# Patient Record
Sex: FEMALE | Race: WHITE | NOT HISPANIC OR LATINO | URBAN - METROPOLITAN AREA
[De-identification: names, ages, dates, MRNs, and addresses within clinical notes are randomized per-mention and may not be internally consistent; named-entity substitution may affect disease eponyms.]

---

## 2022-09-28 ENCOUNTER — INPATIENT (INPATIENT)
Facility: HOSPITAL | Age: 87
LOS: 1 days | Discharge: HOME HEALTH | End: 2022-09-30
Attending: INTERNAL MEDICINE | Admitting: INTERNAL MEDICINE

## 2022-09-28 VITALS
TEMPERATURE: 98 F | OXYGEN SATURATION: 98 % | HEART RATE: 78 BPM | RESPIRATION RATE: 20 BRPM | SYSTOLIC BLOOD PRESSURE: 165 MMHG | DIASTOLIC BLOOD PRESSURE: 87 MMHG

## 2022-09-28 DIAGNOSIS — Z96.651 PRESENCE OF RIGHT ARTIFICIAL KNEE JOINT: Chronic | ICD-10-CM

## 2022-09-28 LAB
ALBUMIN SERPL ELPH-MCNC: 4.2 G/DL — SIGNIFICANT CHANGE UP (ref 3.5–5.2)
ALP SERPL-CCNC: 56 U/L — SIGNIFICANT CHANGE UP (ref 30–115)
ALT FLD-CCNC: 14 U/L — SIGNIFICANT CHANGE UP (ref 0–41)
ANION GAP SERPL CALC-SCNC: 12 MMOL/L — SIGNIFICANT CHANGE UP (ref 7–14)
APTT BLD: 173.3 SEC — CRITICAL HIGH (ref 27–39.2)
APTT BLD: 32.8 SEC — SIGNIFICANT CHANGE UP (ref 27–39.2)
APTT BLD: 86.3 SEC — CRITICAL HIGH (ref 27–39.2)
AST SERPL-CCNC: 22 U/L — SIGNIFICANT CHANGE UP (ref 0–41)
BASOPHILS # BLD AUTO: 0 K/UL — SIGNIFICANT CHANGE UP (ref 0–0.2)
BASOPHILS NFR BLD AUTO: 0 % — SIGNIFICANT CHANGE UP (ref 0–1)
BILIRUB SERPL-MCNC: 0.4 MG/DL — SIGNIFICANT CHANGE UP (ref 0.2–1.2)
BUN SERPL-MCNC: 38 MG/DL — HIGH (ref 10–20)
CALCIUM SERPL-MCNC: 8.9 MG/DL — SIGNIFICANT CHANGE UP (ref 8.4–10.5)
CHLORIDE SERPL-SCNC: 104 MMOL/L — SIGNIFICANT CHANGE UP (ref 98–110)
CK SERPL-CCNC: 72 U/L — SIGNIFICANT CHANGE UP (ref 0–225)
CO2 SERPL-SCNC: 25 MMOL/L — SIGNIFICANT CHANGE UP (ref 17–32)
CREAT SERPL-MCNC: 1.8 MG/DL — HIGH (ref 0.7–1.5)
EGFR: 27 ML/MIN/1.73M2 — LOW
EOSINOPHIL # BLD AUTO: 0.06 K/UL — SIGNIFICANT CHANGE UP (ref 0–0.7)
EOSINOPHIL NFR BLD AUTO: 1.4 % — SIGNIFICANT CHANGE UP (ref 0–8)
GLUCOSE SERPL-MCNC: 97 MG/DL — SIGNIFICANT CHANGE UP (ref 70–99)
HCT VFR BLD CALC: 32.1 % — LOW (ref 37–47)
HCT VFR BLD CALC: 34.9 % — LOW (ref 37–47)
HGB BLD-MCNC: 10.9 G/DL — LOW (ref 12–16)
HGB BLD-MCNC: 12.1 G/DL — SIGNIFICANT CHANGE UP (ref 12–16)
IMM GRANULOCYTES NFR BLD AUTO: 0 % — LOW (ref 0.1–0.3)
INR BLD: 1.11 RATIO — SIGNIFICANT CHANGE UP (ref 0.65–1.3)
LYMPHOCYTES # BLD AUTO: 0.97 K/UL — LOW (ref 1.2–3.4)
LYMPHOCYTES # BLD AUTO: 22.4 % — SIGNIFICANT CHANGE UP (ref 20.5–51.1)
MCHC RBC-ENTMCNC: 29.7 PG — SIGNIFICANT CHANGE UP (ref 27–31)
MCHC RBC-ENTMCNC: 30.2 PG — SIGNIFICANT CHANGE UP (ref 27–31)
MCHC RBC-ENTMCNC: 34 G/DL — SIGNIFICANT CHANGE UP (ref 32–37)
MCHC RBC-ENTMCNC: 34.7 G/DL — SIGNIFICANT CHANGE UP (ref 32–37)
MCV RBC AUTO: 87 FL — SIGNIFICANT CHANGE UP (ref 81–99)
MCV RBC AUTO: 87.5 FL — SIGNIFICANT CHANGE UP (ref 81–99)
MONOCYTES # BLD AUTO: 0.48 K/UL — SIGNIFICANT CHANGE UP (ref 0.1–0.6)
MONOCYTES NFR BLD AUTO: 11.1 % — HIGH (ref 1.7–9.3)
NEUTROPHILS # BLD AUTO: 2.82 K/UL — SIGNIFICANT CHANGE UP (ref 1.4–6.5)
NEUTROPHILS NFR BLD AUTO: 65.1 % — SIGNIFICANT CHANGE UP (ref 42.2–75.2)
NRBC # BLD: 0 /100 WBCS — SIGNIFICANT CHANGE UP (ref 0–0)
NRBC # BLD: 0 /100 WBCS — SIGNIFICANT CHANGE UP (ref 0–0)
PLATELET # BLD AUTO: 180 K/UL — SIGNIFICANT CHANGE UP (ref 130–400)
PLATELET # BLD AUTO: 182 K/UL — SIGNIFICANT CHANGE UP (ref 130–400)
POTASSIUM SERPL-MCNC: 3.9 MMOL/L — SIGNIFICANT CHANGE UP (ref 3.5–5)
POTASSIUM SERPL-SCNC: 3.9 MMOL/L — SIGNIFICANT CHANGE UP (ref 3.5–5)
PROT SERPL-MCNC: 6.4 G/DL — SIGNIFICANT CHANGE UP (ref 6–8)
PROTHROM AB SERPL-ACNC: 12.7 SEC — SIGNIFICANT CHANGE UP (ref 9.95–12.87)
RBC # BLD: 3.67 M/UL — LOW (ref 4.2–5.4)
RBC # BLD: 4.01 M/UL — LOW (ref 4.2–5.4)
RBC # FLD: 13.4 % — SIGNIFICANT CHANGE UP (ref 11.5–14.5)
RBC # FLD: 13.6 % — SIGNIFICANT CHANGE UP (ref 11.5–14.5)
SARS-COV-2 RNA SPEC QL NAA+PROBE: SIGNIFICANT CHANGE UP
SODIUM SERPL-SCNC: 141 MMOL/L — SIGNIFICANT CHANGE UP (ref 135–146)
TROPONIN T SERPL-MCNC: <0.01 NG/ML — SIGNIFICANT CHANGE UP
WBC # BLD: 4.33 K/UL — LOW (ref 4.8–10.8)
WBC # BLD: 5.36 K/UL — SIGNIFICANT CHANGE UP (ref 4.8–10.8)
WBC # FLD AUTO: 4.33 K/UL — LOW (ref 4.8–10.8)
WBC # FLD AUTO: 5.36 K/UL — SIGNIFICANT CHANGE UP (ref 4.8–10.8)

## 2022-09-28 PROCEDURE — 99285 EMERGENCY DEPT VISIT HI MDM: CPT | Mod: FS,CS

## 2022-09-28 PROCEDURE — 71045 X-RAY EXAM CHEST 1 VIEW: CPT | Mod: 26

## 2022-09-28 PROCEDURE — 93010 ELECTROCARDIOGRAM REPORT: CPT

## 2022-09-28 RX ORDER — AMLODIPINE BESYLATE 2.5 MG/1
5 TABLET ORAL DAILY
Refills: 0 | Status: DISCONTINUED | OUTPATIENT
Start: 2022-09-28 | End: 2022-09-30

## 2022-09-28 RX ORDER — ISOSORBIDE MONONITRATE 60 MG/1
30 TABLET, EXTENDED RELEASE ORAL DAILY
Refills: 0 | Status: DISCONTINUED | OUTPATIENT
Start: 2022-09-28 | End: 2022-09-30

## 2022-09-28 RX ORDER — ISOSORBIDE MONONITRATE 60 MG/1
1 TABLET, EXTENDED RELEASE ORAL
Qty: 0 | Refills: 0 | DISCHARGE

## 2022-09-28 RX ORDER — LANOLIN ALCOHOL/MO/W.PET/CERES
3 CREAM (GRAM) TOPICAL AT BEDTIME
Refills: 0 | Status: DISCONTINUED | OUTPATIENT
Start: 2022-09-28 | End: 2022-09-30

## 2022-09-28 RX ORDER — CALCIUM CARBONATE 500(1250)
2 TABLET ORAL
Qty: 0 | Refills: 0 | DISCHARGE

## 2022-09-28 RX ORDER — ZOLPIDEM TARTRATE 10 MG/1
1 TABLET ORAL
Qty: 0 | Refills: 0 | DISCHARGE

## 2022-09-28 RX ORDER — ERGOCALCIFEROL 1.25 MG/1
50000 CAPSULE ORAL
Refills: 0 | Status: DISCONTINUED | OUTPATIENT
Start: 2022-09-28 | End: 2022-09-30

## 2022-09-28 RX ORDER — METOPROLOL TARTRATE 50 MG
12.5 TABLET ORAL EVERY 12 HOURS
Refills: 0 | Status: DISCONTINUED | OUTPATIENT
Start: 2022-09-28 | End: 2022-09-30

## 2022-09-28 RX ORDER — NITROGLYCERIN 6.5 MG
0.4 CAPSULE, EXTENDED RELEASE ORAL
Refills: 0 | Status: DISCONTINUED | OUTPATIENT
Start: 2022-09-28 | End: 2022-09-30

## 2022-09-28 RX ORDER — HEPARIN SODIUM 5000 [USP'U]/ML
2000 INJECTION INTRAVENOUS; SUBCUTANEOUS EVERY 6 HOURS
Refills: 0 | Status: DISCONTINUED | OUTPATIENT
Start: 2022-09-28 | End: 2022-09-30

## 2022-09-28 RX ORDER — CALCIUM CARBONATE 500(1250)
2 TABLET ORAL DAILY
Refills: 0 | Status: DISCONTINUED | OUTPATIENT
Start: 2022-09-28 | End: 2022-09-30

## 2022-09-28 RX ORDER — HEPARIN SODIUM 5000 [USP'U]/ML
INJECTION INTRAVENOUS; SUBCUTANEOUS
Qty: 25000 | Refills: 0 | Status: DISCONTINUED | OUTPATIENT
Start: 2022-09-28 | End: 2022-09-30

## 2022-09-28 RX ORDER — FLUTICASONE PROPIONATE 220 MCG
1 AEROSOL WITH ADAPTER (GRAM) INHALATION
Qty: 0 | Refills: 0 | DISCHARGE

## 2022-09-28 RX ORDER — HEPARIN SODIUM 5000 [USP'U]/ML
4000 INJECTION INTRAVENOUS; SUBCUTANEOUS EVERY 6 HOURS
Refills: 0 | Status: DISCONTINUED | OUTPATIENT
Start: 2022-09-28 | End: 2022-09-30

## 2022-09-28 RX ORDER — PANTOPRAZOLE SODIUM 20 MG/1
40 TABLET, DELAYED RELEASE ORAL
Refills: 0 | Status: DISCONTINUED | OUTPATIENT
Start: 2022-09-28 | End: 2022-09-30

## 2022-09-28 RX ORDER — APIXABAN 2.5 MG/1
1 TABLET, FILM COATED ORAL
Qty: 0 | Refills: 0 | DISCHARGE

## 2022-09-28 RX ORDER — ERGOCALCIFEROL 1.25 MG/1
1 CAPSULE ORAL
Qty: 0 | Refills: 0 | DISCHARGE

## 2022-09-28 RX ORDER — HEPARIN SODIUM 5000 [USP'U]/ML
4000 INJECTION INTRAVENOUS; SUBCUTANEOUS ONCE
Refills: 0 | Status: COMPLETED | OUTPATIENT
Start: 2022-09-28 | End: 2022-09-28

## 2022-09-28 RX ORDER — LEVOTHYROXINE SODIUM 125 MCG
1 TABLET ORAL
Qty: 0 | Refills: 0 | DISCHARGE

## 2022-09-28 RX ORDER — LEVOTHYROXINE SODIUM 125 MCG
50 TABLET ORAL DAILY
Refills: 0 | Status: DISCONTINUED | OUTPATIENT
Start: 2022-09-28 | End: 2022-09-30

## 2022-09-28 RX ORDER — ASPIRIN/CALCIUM CARB/MAGNESIUM 324 MG
1 TABLET ORAL
Qty: 0 | Refills: 0 | DISCHARGE

## 2022-09-28 RX ORDER — LANOLIN ALCOHOL/MO/W.PET/CERES
1 CREAM (GRAM) TOPICAL
Qty: 0 | Refills: 0 | DISCHARGE

## 2022-09-28 RX ORDER — CHLORHEXIDINE GLUCONATE 213 G/1000ML
1 SOLUTION TOPICAL
Refills: 0 | Status: DISCONTINUED | OUTPATIENT
Start: 2022-09-28 | End: 2022-09-30

## 2022-09-28 RX ORDER — CHOLECALCIFEROL (VITAMIN D3) 125 MCG
1 CAPSULE ORAL
Qty: 0 | Refills: 0 | DISCHARGE

## 2022-09-28 RX ORDER — PANTOPRAZOLE SODIUM 20 MG/1
1 TABLET, DELAYED RELEASE ORAL
Qty: 0 | Refills: 0 | DISCHARGE

## 2022-09-28 RX ORDER — METOPROLOL TARTRATE 50 MG
0.5 TABLET ORAL
Qty: 0 | Refills: 0 | DISCHARGE

## 2022-09-28 RX ORDER — ASPIRIN/CALCIUM CARB/MAGNESIUM 324 MG
81 TABLET ORAL DAILY
Refills: 0 | Status: DISCONTINUED | OUTPATIENT
Start: 2022-09-28 | End: 2022-09-30

## 2022-09-28 RX ADMIN — HEPARIN SODIUM 4000 UNIT(S): 5000 INJECTION INTRAVENOUS; SUBCUTANEOUS at 14:31

## 2022-09-28 RX ADMIN — ISOSORBIDE MONONITRATE 30 MILLIGRAM(S): 60 TABLET, EXTENDED RELEASE ORAL at 11:24

## 2022-09-28 RX ADMIN — ERGOCALCIFEROL 50000 UNIT(S): 1.25 CAPSULE ORAL at 11:24

## 2022-09-28 RX ADMIN — HEPARIN SODIUM 1000 UNIT(S)/HR: 5000 INJECTION INTRAVENOUS; SUBCUTANEOUS at 20:40

## 2022-09-28 RX ADMIN — Medication 81 MILLIGRAM(S): at 11:24

## 2022-09-28 RX ADMIN — Medication 12.5 MILLIGRAM(S): at 17:03

## 2022-09-28 RX ADMIN — HEPARIN SODIUM 1000 UNIT(S)/HR: 5000 INJECTION INTRAVENOUS; SUBCUTANEOUS at 14:30

## 2022-09-28 NOTE — H&P ADULT - HISTORY OF PRESENT ILLNESS
The patient is an 87 year old female with past medical history CKD, DLD, HTN, hypothyroidism and new onset a-fib (on eliquis) who presented to the hospital due to palpitations and mild chest pressure.  The patient was recently admitted to [facility] in [place] two weeks ago due to palpitations where she was found to be in new onset a-fib which resolved on its own and she was discharged on eliquis.  She was then readmitted on 9/26 to [facility] due to similar palpitations and mild chest pressure radiating to the back and neck.  She went for CTA head and neck which was unremarkable, there was tortuosity of the proximal carotid arteries in the lower neck.  As per the patient's daughter in law, the troponins were elevated at that time and the patient was taken for a nuclear stress test on 9/26 which showed a left ventricular ejection fraction of >70% with a small sized defect of mild severity in the apical wall that is reversible suggestive of ischemia.  The patient was offered cardiac cath but the patient's family wanted a second opinion and to establish care with a cardiologist prior to cardiac cath.  The patient was discharged on 9/27 after troponins were downtrending.      Early this morning the patient felt palpitations and mild chest pressure and called an ambulance to take her to the hospital.  She states that the palpitations and chest pressure started at around 2 AM this morning and resolved by 4 AM.  The chest pressure was mild but constant, did not radiate and was a 3/10 on the pain severity scale.  Currently the patient denies chest pain or palpitations and feels well.      In the ED:   Troponins were negative this morning, EKG demonstrated sinus rhythm.  Chest x-ray was performed and on my read is within normal limits.   The patient is an 87 year old female with past medical history CKD, DLD, osteoporosis, HTN, hypothyroidism and new onset a-fib (on eliquis) who presented to the hospital due to palpitations and mild chest pressure.  The patient was recently admitted to [facility] in [place] two weeks ago due to palpitations where she was found to be in new onset a-fib which resolved on its own and she was discharged on eliquis.  She was then readmitted on 9/26 to [facility] due to similar palpitations and mild chest pressure radiating to the back and neck.  She went for CTA head and neck which was unremarkable, there was tortuosity of the proximal carotid arteries in the lower neck.  As per the patient's daughter in law, the troponins were elevated at that time and the patient was taken for a nuclear stress test on 9/26 which showed a left ventricular ejection fraction of >70% with a small sized defect of mild severity in the apical wall that is reversible suggestive of ischemia.  The patient was offered cardiac cath but the patient's family wanted a second opinion and to establish care with a cardiologist prior to cardiac cath.  The patient was discharged on 9/27 on metoprolol tartrate 12.5 mg BID and after troponins were downtrending.      Early this morning the patient felt palpitations and mild chest pressure and called an ambulance to take her to the hospital.  She states that the palpitations and chest pressure started at around 2 AM this morning and resolved by 4 AM.  The chest pressure was mild but constant, did not radiate and was a 3/10 on the pain severity scale.  Currently the patient denies chest pain or palpitations and feels well.      In the ED:   Vitals were within normal limits.  Troponins were negative this morning, EKG demonstrated sinus rhythm.  Chest x-ray was performed and on my read is within normal limits.   The patient is an 87 year old female with past medical history CKD, DLD, osteoporosis, HTN, hypothyroidism and new onset a-fib (on eliquis) who presented to the hospital due to palpitations and mild chest pressure.  The patient was recently admitted to [facility] in [place] two weeks ago due to palpitations where she was found to be in new onset a-fib which resolved on its own and she was discharged on eliquis.  She was then readmitted on 9/26 to [facility] due to similar palpitations and mild chest pressure radiating to the back and neck.  She went for CTA head and neck which was unremarkable, there was tortuosity of the proximal carotid arteries in the lower neck.  As per the patient's daughter in law, the troponins were elevated at that time and the patient was taken for a nuclear stress test on 9/26 which showed a left ventricular ejection fraction of >70% with a small sized defect of mild severity in the apical wall that is reversible suggestive of ischemia.  The patient was offered cardiac cath but the patient's family wanted a second opinion and to establish care with a cardiologist prior to cardiac cath.  The patient was discharged on 9/27 on metoprolol tartrate 12.5 mg BID and after troponins were downtrending.      Early this morning the patient felt palpitations and mild chest pressure and called an ambulance to take her to the hospital.  She states that the palpitations and chest pressure started at around 2 AM this morning and resolved by 4 AM.  The chest pressure was mild but constant, did not radiate and was a 3/10 on the pain severity scale.  Currently the patient denies chest pain or palpitations and feels well.      In the ED:   Vitals were within normal limits.  Troponins were negative this morning, EKG demonstrated sinus rhythm.  Chest x-ray was performed and on my read right opacity but not fluid overloaded. The patient is an 87 year old female with past medical history CKD, DLD, osteoporosis, HTN, hypothyroidism and paroxysmal a-fib (diagnosed 2 weeks ago, on eliquis) who presented to the hospital due to palpitations and mild chest pressure.  The patient was recently admitted to [facility] in [place] two weeks ago due to palpitations where she was found to be in new onset a-fib which resolved on its own and she was discharged on eliquis.  She was then readmitted on 9/26 to [facility] due to similar palpitations and mild chest pressure radiating to the back and neck.  She went for CTA head and neck which was unremarkable, there was tortuosity of the proximal carotid arteries in the lower neck.  As per the patient's daughter in law, the troponins were elevated at that time and the patient was taken for a nuclear stress test on 9/26 which showed a left ventricular ejection fraction of >70% with a small sized defect of mild severity in the apical wall that is reversible suggestive of ischemia.  The patient was offered cardiac cath but the patient's family wanted a second opinion and to establish care with a cardiologist prior to cardiac cath.  The patient was discharged on 9/27 on metoprolol tartrate 12.5 mg BID and after troponins were downtrending.      Early this morning the patient felt palpitations and mild chest pressure and called an ambulance to take her to the hospital.  She states that the palpitations and chest pressure started at around 2 AM this morning and resolved by 4 AM.  The chest pressure was mild but constant, did not radiate and was a 3/10 on the pain severity scale.  Currently the patient denies chest pain or palpitations and feels well.      In the ED:   Vitals were within normal limits.  Troponins were negative this morning, EKG demonstrated sinus rhythm.  Chest x-ray was performed and on my read right opacity but not fluid overloaded.

## 2022-09-28 NOTE — H&P ADULT - ASSESSMENT
The patient is an 87 year old female with past medical history CKD, DLD, osteoporosis, HTN, hypothyroidism and new onset a-fib (on eliquis) who presented to the hospital due to palpitations and mild chest pressure.  Patient was recently admitted to [facility] due to palpitations and new onset a-fib, nuclear stress test on 9/26 was done there and demonstrated a left ventricular ejection fraction of >70% with a small sized defect of mild severity in the apical wall that is reversible suggestive of ischemia.     #Palpitations, chest pressure  -Currently patient is asymptomatic, denies chest pressure or palpitations  -Nuclear stress at [facility] demonstrated a small sized defect of mild severity in the apical wall   -EKG sinus rhythm   -Troponin this morning negative  -Repeat Troponin for 11 AM  -Order echo  -TSH    #A-fib  -currently in sinus rhythm  -c/w eliquis  -c/w metoprolol tartrate    #HTN  -c/w amlodipine  -c/w enalipril, can hold if creatinine is uptrending    #CKD  -Cr: 1.8 (baseline 1.5-1.8)  -trend creatinines    #DLD  -patient was started on lipitor 3 months ago but developed lower extremity edema and was told to stop the medication by her PCP    #Osteoporosis  -c/w ergocalciferol once a week  -c/w calcium carbonate  -c/w vitamin D3    #Hypothyroidism  -c/w levothyroxine      The patient is an 87 year old female with past medical history CKD, DLD, osteoporosis, HTN, hypothyroidism and new onset a-fib (on eliquis) who presented to the hospital due to palpitations and mild chest pressure.  Patient was recently admitted to [facility] due to palpitations and new onset a-fib, nuclear stress test on 9/26 was done there and demonstrated a left ventricular ejection fraction of >70% with a small sized defect of mild severity in the apical wall that is reversible suggestive of ischemia.     #Palpitations, chest pressure  -Currently patient is asymptomatic, denies chest pressure or palpitations  -Nuclear stress at [facility] demonstrated a small sized defect of mild severity in the apical wall   -EKG sinus rhythm   -Troponin this morning negative  -Cardiology fellow contacted (Dr Soler)  -Switch eliquis to heparin drip in case patient goes to cardiac cath  -Repeat Troponin for 11 AM  -Order echo  -TSH  -C/w aspirin  -C/w imdur  -Monitor on tele    #Paroxysmal A-fib  -currently in sinus rhythm  -c/w eliquis  -c/w metoprolol tartrate    #HTN  -c/w amlodipine  -c/w enalipril, can hold if creatinine is uptrending    #CKD  -Cr: 1.8 (baseline 1.5-1.8)  -trend creatinines    #DLD  -patient was started on lipitor 3 months ago but developed lower extremity edema and was told to stop the medication by her PCP    #Osteoporosis  -c/w ergocalciferol once a week  -c/w calcium carbonate  -c/w vitamin D3    #Hypothyroidism  -c/w levothyroxine        The patient is an 87 year old female with past medical history CKD, DLD, osteoporosis, HTN, hypothyroidism and new onset a-fib (on eliquis) who presented to the hospital due to palpitations and mild chest pressure.  Patient was recently admitted to [facility] due to palpitations and new onset a-fib, nuclear stress test on 9/26 was done there and demonstrated a left ventricular ejection fraction of >70% with a small sized defect of mild severity in the apical wall that is reversible suggestive of ischemia.     #Palpitations, chest pressure  -Currently patient is asymptomatic, denies chest pressure or palpitations  -Nuclear stress at [facility] demonstrated a small sized defect of mild severity in the apical wall   -EKG sinus rhythm   -Troponin this morning negative  -Cardiology fellow contacted (Dr Soler)  -Switch eliquis to heparin drip in case patient goes to cardiac cath  -Repeat Troponin for 11 AM  -Ordered echo  -Ordered TSH  -C/w aspirin  -C/w imdur  -Monitor on tele    #Paroxysmal A-fib  -currently in sinus rhythm  -c/w eliquis  -c/w metoprolol tartrate    #HTN  -c/w amlodipine  -c/w enalipril, can hold if creatinine is uptrending    #CKD  -Cr: 1.8 (baseline 1.5-1.8)  -trend creatinines    #DLD  -patient was started on lipitor 3 months ago but developed lower extremity edema and was told to stop the medication by her PCP    #Osteoporosis  -c/w ergocalciferol once a week    #Hypothyroidism  -c/w levothyroxine  -f/u TSH in the AM    GI prophylaxis: protonix  DVT prophylaxis: heparin drip  Activity: as tolerated  Pending: possible cardiac cath, patient is NPO at midnight tonight        The patient is an 87 year old female with past medical history CKD, DLD, osteoporosis, HTN, hypothyroidism and new onset a-fib (on eliquis) who presented to the hospital due to palpitations and mild chest pressure.  Patient was recently admitted to [facility] due to palpitations and new onset a-fib, nuclear stress test on 9/26 was done there and demonstrated a left ventricular ejection fraction of >70% with a small sized defect of mild severity in the apical wall that is reversible suggestive of ischemia.     #Palpitations, chest pressure  -Currently patient is asymptomatic, denies chest pressure or palpitations  -Nuclear stress at [facility] demonstrated a small sized defect of mild severity in the apical wall   -EKG sinus rhythm   -Troponin this morning negative  -Cardiology fellow contacted (Dr Soler)  -Switch eliquis to heparin drip in case patient goes to cardiac cath  -Repeat Troponin for 11 AM  -Ordered echo  -Ordered TSH  -C/w aspirin  -C/w imdur  -Monitor on tele    #Paroxysmal A-fib  -currently in sinus rhythm  -was on eliquis, switch to heparin drip in the event that patient goes to cardiac cath  -c/w metoprolol tartrate    #HTN  -c/w amlodipine  -c/w enalipril, can hold if creatinine is uptrending    #CKD  -Cr: 1.8 (baseline 1.5-1.8)  -trend creatinines    #DLD  -patient was started on lipitor 3 months ago but developed lower extremity edema and was told to stop the medication by her PCP    #Osteoporosis  -c/w ergocalciferol once a week    #Hypothyroidism  -c/w levothyroxine  -f/u TSH in the AM    GI prophylaxis: protonix  DVT prophylaxis: heparin drip  Activity: as tolerated  Pending: possible cardiac cath, patient is NPO at midnight tonight

## 2022-09-28 NOTE — ED PROVIDER NOTE - PHYSICAL EXAMINATION
CONSTITUTIONAL: Well-appearing; well-nourished; in no apparent distress.   EYES: PERRL; EOM intact.   ENT: normal nose; no rhinorrhea; normal pharynx with no tonsillar hypertrophy.   NECK: Supple; non-tender; no cervical lymphadenopathy.  CARDIOVASCULAR: Normal S1, S2; no murmurs, rubs, or gallops. Equal radial pulses  RESPIRATORY: Normal chest excursion with respiration; breath sounds clear and equal bilaterally; no wheezes, rhonchi, or rales.  GI/: Normal bowel sounds; non-distended; non-tender; no palpable organomegaly.   MS: No evidence of trauma or deformity. Normal ROM in all four extremities; non-tender to palpation; distal pulses are normal.  Extrem: no peripheral edema. No calf ttp   SKIN: Normal for age and race; warm; dry; good turgor; no apparent lesions or exudate.   NEURO/PSYCH: A & O x 4; grossly unremarkable. mood and manner are appropriate.

## 2022-09-28 NOTE — ED PROVIDER NOTE - NS ED ROS FT
Constitutional: no fever, chills, no recent weight loss, change in appetite or malaise  Eyes: no redness/discharge/pain/vision changes  ENT: no rhinorrhea/ear pain/sore throat  Cardiac: + CP. No SOB or edema.  Respiratory: No cough or respiratory distress  GI: No nausea, vomiting, diarrhea or abdominal pain.  : No dysuria, frequency, urgency or hematuria  MS: no pain to back or extremities, no loss of ROM, no weakness  Neuro: No headache or weakness. No LOC.  Extrem: no leg pain or swelling  Skin: No skin rash.  Endocrine: + hx of thyroid disease  Except as documented in the HPI, all other systems are negative.

## 2022-09-28 NOTE — H&P ADULT - NSHPLABSRESULTS_GEN_ALL_CORE
12.1   4.33  )-----------( 182      ( 28 Sep 2022 05:40 )             34.9       09-28    141  |  104  |  38<H>  ----------------------------<  97  3.9   |  25  |  1.8<H>    Ca    8.9      28 Sep 2022 05:40    TPro  6.4  /  Alb  4.2  /  TBili  0.4  /  DBili  x   /  AST  22  /  ALT  14  /  AlkPhos  56  09-28                  PT/INR - ( 28 Sep 2022 05:40 )   PT: 12.70 sec;   INR: 1.11 ratio         PTT - ( 28 Sep 2022 05:40 )  PTT:32.8 sec    Lactate Trend      CARDIAC MARKERS ( 28 Sep 2022 05:40 )  x     / <0.01 ng/mL / x     / x     / x            CAPILLARY BLOOD GLUCOSE

## 2022-09-28 NOTE — H&P ADULT - NSICDXPASTMEDICALHX_GEN_ALL_CORE_FT
PAST MEDICAL HISTORY:  Atrial fibrillation     Chronic kidney disease, unspecified CKD stage     Dyslipidemia     Hypertension     Hypothyroidism

## 2022-09-28 NOTE — ED PROVIDER NOTE - OBJECTIVE STATEMENT
87-year-old female with past medical history of hypothyroid, hypertension, hyperlipidemia, recently admitted to [facility] x2 weeks ago and diagnosed with new onset A. fib now on Eliquis presenting to the ER for evaluation.  Patient brought in by family states that 2:30 in the morning woke up with pressure like chest pain with associated intermittent palpitations and shortness of breath.  During prior hospital stay at [facility] patient was told had abnormal stress and was advised for cardiac catheterization.  As per family preferred to follow-up with cardiologist as outpatient.  Patient denies associated fever, cough, leg pain or swelling, abdominal pain, nausea, vomiting, diarrhea, sick contacts, history of DVT or PE. Patient has appointment with Dr. Pickering in 1 week.

## 2022-09-28 NOTE — ED PROVIDER NOTE - CLINICAL SUMMARY MEDICAL DECISION MAKING FREE TEXT BOX
chest pain, hx of afib and abnl stress.  asympt in ED. ekg nsr.  cta, rrr, ab soft, nt.     admit for cardiac work up

## 2022-09-28 NOTE — ED PROVIDER NOTE - NS ED ATTENDING STATEMENT MOD
This was a shared visit with the LYUDMILA. I reviewed and verified the documentation and independently performed the documented:

## 2022-09-29 LAB
A1C WITH ESTIMATED AVERAGE GLUCOSE RESULT: 5.2 % — SIGNIFICANT CHANGE UP (ref 4–5.6)
ALBUMIN SERPL ELPH-MCNC: 3.6 G/DL — SIGNIFICANT CHANGE UP (ref 3.5–5.2)
ALP SERPL-CCNC: 51 U/L — SIGNIFICANT CHANGE UP (ref 30–115)
ALT FLD-CCNC: 10 U/L — SIGNIFICANT CHANGE UP (ref 0–41)
ANION GAP SERPL CALC-SCNC: 11 MMOL/L — SIGNIFICANT CHANGE UP (ref 7–14)
APTT BLD: 75.4 SEC — CRITICAL HIGH (ref 27–39.2)
AST SERPL-CCNC: 18 U/L — SIGNIFICANT CHANGE UP (ref 0–41)
BASOPHILS # BLD AUTO: 0.01 K/UL — SIGNIFICANT CHANGE UP (ref 0–0.2)
BASOPHILS NFR BLD AUTO: 0.2 % — SIGNIFICANT CHANGE UP (ref 0–1)
BILIRUB SERPL-MCNC: 0.3 MG/DL — SIGNIFICANT CHANGE UP (ref 0.2–1.2)
BUN SERPL-MCNC: 39 MG/DL — HIGH (ref 10–20)
CALCIUM SERPL-MCNC: 8.4 MG/DL — SIGNIFICANT CHANGE UP (ref 8.4–10.5)
CHLORIDE SERPL-SCNC: 108 MMOL/L — SIGNIFICANT CHANGE UP (ref 98–110)
CHOLEST SERPL-MCNC: 144 MG/DL — SIGNIFICANT CHANGE UP
CO2 SERPL-SCNC: 25 MMOL/L — SIGNIFICANT CHANGE UP (ref 17–32)
CREAT SERPL-MCNC: 1.8 MG/DL — HIGH (ref 0.7–1.5)
EGFR: 27 ML/MIN/1.73M2 — LOW
EOSINOPHIL # BLD AUTO: 0.13 K/UL — SIGNIFICANT CHANGE UP (ref 0–0.7)
EOSINOPHIL NFR BLD AUTO: 2.2 % — SIGNIFICANT CHANGE UP (ref 0–8)
ESTIMATED AVERAGE GLUCOSE: 103 MG/DL — SIGNIFICANT CHANGE UP (ref 68–114)
GLUCOSE SERPL-MCNC: 93 MG/DL — SIGNIFICANT CHANGE UP (ref 70–99)
HCT VFR BLD CALC: 31.4 % — LOW (ref 37–47)
HDLC SERPL-MCNC: 68 MG/DL — SIGNIFICANT CHANGE UP
HGB BLD-MCNC: 10.7 G/DL — LOW (ref 12–16)
IMM GRANULOCYTES NFR BLD AUTO: 0.2 % — SIGNIFICANT CHANGE UP (ref 0.1–0.3)
LIPID PNL WITH DIRECT LDL SERPL: 67 MG/DL — SIGNIFICANT CHANGE UP
LYMPHOCYTES # BLD AUTO: 1.58 K/UL — SIGNIFICANT CHANGE UP (ref 1.2–3.4)
LYMPHOCYTES # BLD AUTO: 26.6 % — SIGNIFICANT CHANGE UP (ref 20.5–51.1)
MAGNESIUM SERPL-MCNC: 1.9 MG/DL — SIGNIFICANT CHANGE UP (ref 1.8–2.4)
MCHC RBC-ENTMCNC: 29.2 PG — SIGNIFICANT CHANGE UP (ref 27–31)
MCHC RBC-ENTMCNC: 34.1 G/DL — SIGNIFICANT CHANGE UP (ref 32–37)
MCV RBC AUTO: 85.8 FL — SIGNIFICANT CHANGE UP (ref 81–99)
MONOCYTES # BLD AUTO: 0.61 K/UL — HIGH (ref 0.1–0.6)
MONOCYTES NFR BLD AUTO: 10.3 % — HIGH (ref 1.7–9.3)
NEUTROPHILS # BLD AUTO: 3.59 K/UL — SIGNIFICANT CHANGE UP (ref 1.4–6.5)
NEUTROPHILS NFR BLD AUTO: 60.5 % — SIGNIFICANT CHANGE UP (ref 42.2–75.2)
NON HDL CHOLESTEROL: 76 MG/DL — SIGNIFICANT CHANGE UP
NRBC # BLD: 0 /100 WBCS — SIGNIFICANT CHANGE UP (ref 0–0)
PHOSPHATE SERPL-MCNC: 4.5 MG/DL — SIGNIFICANT CHANGE UP (ref 2.1–4.9)
PLATELET # BLD AUTO: 156 K/UL — SIGNIFICANT CHANGE UP (ref 130–400)
POTASSIUM SERPL-MCNC: 3.6 MMOL/L — SIGNIFICANT CHANGE UP (ref 3.5–5)
POTASSIUM SERPL-SCNC: 3.6 MMOL/L — SIGNIFICANT CHANGE UP (ref 3.5–5)
PROT SERPL-MCNC: 5.5 G/DL — LOW (ref 6–8)
RBC # BLD: 3.66 M/UL — LOW (ref 4.2–5.4)
RBC # FLD: 13.4 % — SIGNIFICANT CHANGE UP (ref 11.5–14.5)
SODIUM SERPL-SCNC: 144 MMOL/L — SIGNIFICANT CHANGE UP (ref 135–146)
TRIGL SERPL-MCNC: 45 MG/DL — SIGNIFICANT CHANGE UP
TSH SERPL-MCNC: 5.08 UIU/ML — HIGH (ref 0.27–4.2)
WBC # BLD: 5.93 K/UL — SIGNIFICANT CHANGE UP (ref 4.8–10.8)
WBC # FLD AUTO: 5.93 K/UL — SIGNIFICANT CHANGE UP (ref 4.8–10.8)

## 2022-09-29 PROCEDURE — 99233 SBSQ HOSP IP/OBS HIGH 50: CPT

## 2022-09-29 PROCEDURE — 99223 1ST HOSP IP/OBS HIGH 75: CPT

## 2022-09-29 RX ORDER — INFLUENZA VIRUS VACCINE 15; 15; 15; 15 UG/.5ML; UG/.5ML; UG/.5ML; UG/.5ML
0.7 SUSPENSION INTRAMUSCULAR ONCE
Refills: 0 | Status: COMPLETED | OUTPATIENT
Start: 2022-09-29 | End: 2022-09-30

## 2022-09-29 RX ADMIN — Medication 50 MICROGRAM(S): at 05:57

## 2022-09-29 RX ADMIN — Medication 12.5 MILLIGRAM(S): at 05:57

## 2022-09-29 RX ADMIN — Medication 12.5 MILLIGRAM(S): at 19:02

## 2022-09-29 RX ADMIN — PANTOPRAZOLE SODIUM 40 MILLIGRAM(S): 20 TABLET, DELAYED RELEASE ORAL at 08:09

## 2022-09-29 RX ADMIN — ISOSORBIDE MONONITRATE 30 MILLIGRAM(S): 60 TABLET, EXTENDED RELEASE ORAL at 12:36

## 2022-09-29 RX ADMIN — HEPARIN SODIUM 1000 UNIT(S)/HR: 5000 INJECTION INTRAVENOUS; SUBCUTANEOUS at 18:14

## 2022-09-29 RX ADMIN — HEPARIN SODIUM 1000 UNIT(S)/HR: 5000 INJECTION INTRAVENOUS; SUBCUTANEOUS at 05:40

## 2022-09-29 RX ADMIN — Medication 2 TABLET(S): at 12:35

## 2022-09-29 RX ADMIN — Medication 81 MILLIGRAM(S): at 12:36

## 2022-09-29 RX ADMIN — AMLODIPINE BESYLATE 5 MILLIGRAM(S): 2.5 TABLET ORAL at 05:56

## 2022-09-29 NOTE — CONSULT NOTE ADULT - ASSESSMENT
Impression   # History of a fib - CHADSAVSC 4  ECG reviewed - LAFB. Sinus  No a fib on tele   # Revesible ischemia on stress test     Recommendations   - Continue full AC   - Continue metoprolol for rate control   - Get records from [facility] (echo and stress test)   - Cardiology follow up     Will discuss with attending  Impression   # History of a fib - CHADSAVSC 4  ECG reviewed - LAFB. Sinus  No a fib on tele and patient having palpitations. Unlikely related to arrhythmia   # Reversible ischemia on stress test     Recommendations   - Continue full AC   - Continue metoprolol for rate control   - Get records from [facility] (echo and stress test)   - Cardiology follow up for ischemic workup     Discussed with attending

## 2022-09-29 NOTE — CONSULT NOTE ADULT - ATTENDING COMMENTS
Pt seen 9/29    Cardiologist: Dr. Pickering    88 yo F with recently diagnosed paroxysmal AF (on Eliquis for CHADS VASc 4) here with palpitations and abnormal stress test.     Rec  - Ischemic work up per cardiology  - Patient complains of intermittent palpitations but has not had any further recurrence of AFib on tele. Likely symptoms are due to anxiety as patient admits to having a lot of "emotions"  - Cont Eliquis 2.5 mg PO BID (meets 3/3 criteria for low dose - Wt < 60 kg, Age > 80, and Cr > 1.5)    Please follow up as needed and recall as needed  Plan d/w pt, family, and Dr. Pickering

## 2022-09-29 NOTE — PROGRESS NOTE ADULT - SUBJECTIVE AND OBJECTIVE BOX
OLIVA EAST 87y Female  MRN#: [id number]   Hospital Day: 1d    SUBJECTIVE  Patient is a 87y old Female who presents with a chief complaint of Palpitations (29 Sep 2022 13:30)  Currently admitted to medicine with the primary diagnosis of Chest pain      INTERVAL HPI AND OVERNIGHT EVENTS:  Patient was examined and seen at bedside. This morning she is resting comfortably in bed and reports no issues or overnight events.    OBJECTIVE  PAST MEDICAL & SURGICAL HISTORY  Hypertension    Hypothyroidism    Dyslipidemia    Chronic kidney disease, unspecified CKD stage    Atrial fibrillation    History of total knee replacement, right      ALLERGIES:  No Known Allergies    MEDICATIONS:  STANDING MEDICATIONS  amLODIPine   Tablet 5 milliGRAM(s) Oral daily  aspirin enteric coated 81 milliGRAM(s) Oral daily  calcium carbonate    500 mG (Tums) Chewable 2 Tablet(s) Chew daily  chlorhexidine 4% Liquid 1 Application(s) Topical <User Schedule>  enalapril 20 milliGRAM(s) Oral daily  ergocalciferol 87080 Unit(s) Oral every week  heparin  Infusion.  Unit(s)/Hr IV Continuous <Continuous>  influenza  Vaccine (HIGH DOSE) 0.7 milliLiter(s) IntraMuscular once  isosorbide   mononitrate ER Tablet (IMDUR) 30 milliGRAM(s) Oral daily  levothyroxine 50 MICROGram(s) Oral daily  metoprolol tartrate 12.5 milliGRAM(s) Oral every 12 hours  pantoprazole    Tablet 40 milliGRAM(s) Oral before breakfast    PRN MEDICATIONS  heparin   Injectable 4000 Unit(s) IV Push every 6 hours PRN  heparin   Injectable 2000 Unit(s) IV Push every 6 hours PRN  melatonin Oral Tab/Cap - Peds 3 milliGRAM(s) Oral at bedtime PRN  nitroglycerin     SubLingual 0.4 milliGRAM(s) SubLingual every 5 minutes PRN      VITAL SIGNS: Last 24 Hours  T(C): 36.1 (29 Sep 2022 13:33), Max: 36.5 (28 Sep 2022 19:45)  T(F): 97 (29 Sep 2022 13:33), Max: 97.7 (28 Sep 2022 19:45)  HR: 60 (29 Sep 2022 13:33) (59 - 75)  BP: 151/74 (29 Sep 2022 13:33) (124/81 - 151/74)  BP(mean): --  RR: 18 (29 Sep 2022 13:33) (16 - 18)  SpO2: 97% (29 Sep 2022 01:22) (97% - 99%)    LABS:                        10.7   5.93  )-----------( 156      ( 29 Sep 2022 03:42 )             31.4     09-29    144  |  108  |  39<H>  ----------------------------<  93  3.6   |  25  |  1.8<H>    Ca    8.4      29 Sep 2022 03:42  Phos  4.5     09-29  Mg     1.9     09-29    TPro  5.5<L>  /  Alb  3.6  /  TBili  0.3  /  DBili  x   /  AST  18  /  ALT  10  /  AlkPhos  51  09-29    PT/INR - ( 28 Sep 2022 05:40 )   PT: 12.70 sec;   INR: 1.11 ratio         PTT - ( 29 Sep 2022 03:42 )  PTT:75.4 sec      Troponin T, Serum: <0.01 ng/mL (09-28-22 @ 18:29)  Creatine Kinase, Serum: 72 U/L (09-28-22 @ 18:29)      CARDIAC MARKERS ( 28 Sep 2022 18:29 )  x     / <0.01 ng/mL / 72 U/L / x     / x      CARDIAC MARKERS ( 28 Sep 2022 11:21 )  x     / <0.01 ng/mL / x     / x     / x      CARDIAC MARKERS ( 28 Sep 2022 05:40 )  x     / <0.01 ng/mL / x     / x     / x          RADIOLOGY:      PHYSICAL EXAM:  CONSTITUTIONAL: No acute distress, well-developed, well-groomed, AAOx3  HEAD: Atraumatic, normocephalic  EYES: EOM intact, PERRLA, conjunctiva and sclera clear  ENT: Supple, no masses, no thyromegaly, no bruits, no JVD; moist mucous membranes  PULMONARY: Clear to auscultation bilaterally; no wheezes, rales, or rhonchi  CARDIOVASCULAR: Regular rate and rhythm; no murmurs, rubs, or gallops  GASTROINTESTINAL: Soft, non-tender, non-distended; bowel sounds present  MUSCULOSKELETAL: 2+ peripheral pulses; no clubbing, no cyanosis, no edema  NEUROLOGY: non-focal  SKIN: No rashes or lesions; warm and dry

## 2022-09-29 NOTE — CONSULT NOTE ADULT - SUBJECTIVE AND OBJECTIVE BOX
Patient is a 87y old  Female who presents with a chief complaint of Palpitations (29 Sep 2022 13:30)      HPI:  The patient is an 87 year old female with past medical history CKD, DLD, osteoporosis, HTN, hypothyroidism and paroxysmal a-fib (diagnosed 2 weeks ago, on eliquis) who presented to the hospital due to palpitations and mild chest pressure.  The patient was recently admitted to [facility] in [place] two weeks ago due to palpitations where she was found to be in new onset a-fib which resolved on its own and she was discharged on eliquis.  She was then readmitted on 9/26 to [facility] due to similar palpitations and mild chest pressure radiating to the back and neck.  She went for CTA head and neck which was unremarkable. As per  the patient's daughter in law, the troponins were elevated at that time and the patient was taken for a nuclear stress test on 9/26 which showed a left ventricular ejection fraction of >70% with a small sized defect of mild severity in the apical wall that is reversible suggestive of ischemia.  The patient was offered cardiac cath but the patient's family wanted a second opinion and to establish care with a cardiologist prior to cardiac cath.  The patient was discharged on 9/27 on metoprolol tartrate 12.5 mg BID and after troponins were downtrending.      Early yesterday morning the patient felt palpitations and mild chest pressure and called an ambulance to take her to the hospital.  She states that the palpitations and chest pressure started at around 2 AM yesterday morning and resolved by 4 AM.  The chest pressure was mild but constant, did not radiate and was a 3/10 on the pain severity scale.  Currently the patient denies chest pain or palpitations and feels well.      In the ED:   Vitals were within normal limits.  Troponins were negative this morning, EKG demonstrated sinus rhythm.  Chest x-ray was performed and on my read right opacity but not fluid overloaded. (28 Sep 2022 09:04)      PAST MEDICAL & SURGICAL HISTORY:  Hypertension      Hypothyroidism      Dyslipidemia      Chronic kidney disease, unspecified CKD stage      Atrial fibrillation      History of total knee replacement, right          PREVIOUS DIAGNOSTIC TESTING:      ECHO  FINDINGS:    STRESS TEST  FINDINGS:    CATHETERIZATION  FINDINGS:    MEDICATIONS  (STANDING):  amLODIPine   Tablet 5 milliGRAM(s) Oral daily  aspirin enteric coated 81 milliGRAM(s) Oral daily  calcium carbonate    500 mG (Tums) Chewable 2 Tablet(s) Chew daily  chlorhexidine 4% Liquid 1 Application(s) Topical <User Schedule>  enalapril 20 milliGRAM(s) Oral daily  ergocalciferol 03834 Unit(s) Oral every week  heparin  Infusion.  Unit(s)/Hr (10 mL/Hr) IV Continuous <Continuous>  influenza  Vaccine (HIGH DOSE) 0.7 milliLiter(s) IntraMuscular once  isosorbide   mononitrate ER Tablet (IMDUR) 30 milliGRAM(s) Oral daily  levothyroxine 50 MICROGram(s) Oral daily  metoprolol tartrate 12.5 milliGRAM(s) Oral every 12 hours  pantoprazole    Tablet 40 milliGRAM(s) Oral before breakfast    MEDICATIONS  (PRN):  heparin   Injectable 4000 Unit(s) IV Push every 6 hours PRN For aPTT less than 40  heparin   Injectable 2000 Unit(s) IV Push every 6 hours PRN For aPTT between 40 - 57  melatonin Oral Tab/Cap - Peds 3 milliGRAM(s) Oral at bedtime PRN Insomnia  nitroglycerin     SubLingual 0.4 milliGRAM(s) SubLingual every 5 minutes PRN Chest Pain      FAMILY HISTORY:  FH: prostate cancer (Father)        SOCIAL HISTORY:  CIGARETTES: none  ALCOHOL:none  DRUGS:none                      REVIEW OF SYSTEMS:  CONSTITUTIONAL: No distress, Looks stable  NECK: No pain or stiffness  RESPIRATORY: No cough, wheezing, shortness of breath  CARDIOVASCULAR: No chest pain, SOB, palpitations, leg swelling  GASTROINTESTINAL: No abdominal or epigastric pain. No nausea, vomiting, or hematemesis;  No melena.  NEUROLOGICAL: No dizziness, headaches, memory loss, loss of strength  SKIN: No itching, burning, rashes, or lesions   ENDOCRINE: No heat or cold intolerance  MUSCULOSKELETAL: No joint pain, No  swelling; No muscle pain  PSYCHIATRIC: No depression, anxiety, mood swings, or difficulty sleeping  ALLERGY: No hives, itching, rash          Vital Signs Last 24 Hrs  T(C): 36.1 (29 Sep 2022 13:33), Max: 36.2 (29 Sep 2022 05:17)  T(F): 97 (29 Sep 2022 13:33), Max: 97.1 (29 Sep 2022 05:17)  HR: 60 (29 Sep 2022 13:33) (59 - 65)  BP: 151/74 (29 Sep 2022 13:33) (132/62 - 151/74)  BP(mean): --  RR: 18 (29 Sep 2022 13:33) (18 - 18)  SpO2: 97% (29 Sep 2022 01:22) (97% - 97%)    Parameters below as of 29 Sep 2022 01:22  Patient On (Oxygen Delivery Method): room air                          PHYSICAL EXAM:  GENERAL: No distress, well developed  HEAD:  Atraumatic, Normocephalic  NECK: Supple, No JVD, No Bruit of either carotid arteries  NERVOUS SYSTEM:  Alert, Awake, Oriented to time, place, person; Normal memory and speech; Normal motor Strength 5/5 B/L upper and lower extremities  CHEST/LUNG: Normal air entry to lung base bilaterally; No wheeze, crackle, rales, rhonchi  HEART: Regular heart beat, S1, A2, P2, No S3, No S4, No gallop, No murmur  ABDOMEN: Soft, Non tender, Non distended; Bowel sounds present  EXTREMITIES:  2+ Peripheral Pulses, No clubbing, No edema  SKIN: No rashes or lesions    TELEMETRY: nsr    ECG:Diagnosis Line Normal sinus rhythm  Left axis deviation  Left anterior fascicular block  Low voltage QRS  Septal infarct , age undetermined  Inferior infarct , age undetermined  Abnormal ECG      I&O's Detail    28 Sep 2022 07:01  -  29 Sep 2022 07:00  --------------------------------------------------------  IN:    Heparin Infusion: 18 mL  Total IN: 18 mL    OUT:    Voided (mL): 700 mL  Total OUT: 700 mL    Total NET: -682 mL      29 Sep 2022 07:01  -  29 Sep 2022 20:58  --------------------------------------------------------  IN:    Heparin Infusion: 120 mL    Oral Fluid: 240 mL  Total IN: 360 mL    OUT:    Voided (mL): 500 mL  Total OUT: 500 mL    Total NET: -140 mL          LABS:                        10.7   5.93  )-----------( 156      ( 29 Sep 2022 03:42 )             31.4     09-29    144  |  108  |  39<H>  ----------------------------<  93  3.6   |  25  |  1.8<H>    Ca    8.4      29 Sep 2022 03:42  Phos  4.5     09-29  Mg     1.9     09-29    TPro  5.5<L>  /  Alb  3.6  /  TBili  0.3  /  DBili  x   /  AST  18  /  ALT  10  /  AlkPhos  51  09-29    CARDIAC MARKERS ( 28 Sep 2022 18:29 )  x     / <0.01 ng/mL / 72 U/L / x     / x      CARDIAC MARKERS ( 28 Sep 2022 11:21 )  x     / <0.01 ng/mL / x     / x     / x      CARDIAC MARKERS ( 28 Sep 2022 05:40 )  x     / <0.01 ng/mL / x     / x     / x          PT/INR - ( 28 Sep 2022 05:40 )   PT: 12.70 sec;   INR: 1.11 ratio         PTT - ( 29 Sep 2022 03:42 )  PTT:75.4 sec    I&O's Summary    28 Sep 2022 07:01  -  29 Sep 2022 07:00  --------------------------------------------------------  IN: 18 mL / OUT: 700 mL / NET: -682 mL    29 Sep 2022 07:01  -  29 Sep 2022 20:58  --------------------------------------------------------  IN: 360 mL / OUT: 500 mL / NET: -140 mL        RADIOLOGY & ADDITIONAL STUDIES:
    HPI:  The patient is an 87 year old female with past medical history CKD, DLD, osteoporosis, HTN, hypothyroidism and paroxysmal a-fib (diagnosed 2 weeks ago, on eliquis) who presented to the hospital due to palpitations and mild chest pressure.  The patient was recently admitted to [facility] in [place] two weeks ago due to palpitations where she was found to be in new onset a-fib which resolved on its own and she was discharged on eliquis.  She was then readmitted on  to [facility] due to similar palpitations and mild chest pressure radiating to the back and neck.  She went for CTA head and neck which was unremarkable, there was tortuosity of the proximal carotid arteries in the lower neck.  As per the patient's daughter in law, the troponins were elevated at that time and the patient was taken for a nuclear stress test on  which showed a left ventricular ejection fraction of >70% with a small sized defect of mild severity in the apical wall that is reversible suggestive of ischemia.  The patient was offered cardiac cath but the patient's family wanted a second opinion and to establish care with a cardiologist prior to cardiac cath.  The patient was discharged on  on metoprolol tartrate 12.5 mg BID and after troponins were downtrending.      Early this morning the patient felt palpitations and mild chest pressure and called an ambulance to take her to the hospital.  She states that the palpitations and chest pressure started at around 2 AM this morning and resolved by 4 AM.  The chest pressure was mild but constant, did not radiate and was a 3/10 on the pain severity scale.  Currently the patient denies chest pain or palpitations and feels well.      In the ED:   Vitals were within normal limits.  Troponins were negative this morning, EKG demonstrated sinus rhythm.  Chest x-ray was performed and on my read right opacity but not fluid overloaded. (28 Sep 2022 09:04)    Patient never in a fib while in the hospital so far   No palpitations   No chest pain     PAST MEDICAL & SURGICAL HISTORY  Hypertension    Hypothyroidism    Dyslipidemia    Chronic kidney disease, unspecified CKD stage    Atrial fibrillation    History of total knee replacement, right        FAMILY HISTORY:  FAMILY HISTORY:  FH: prostate cancer (Father)        SOCIAL HISTORY:  Social History:  Patient lives at home alone and feels safe. (28 Sep 2022 09:04)      ALLERGIES:  No Known Allergies      MEDICATIONS:  amLODIPine   Tablet 5 milliGRAM(s) Oral daily  aspirin enteric coated 81 milliGRAM(s) Oral daily  calcium carbonate    500 mG (Tums) Chewable 2 Tablet(s) Chew daily  chlorhexidine 4% Liquid 1 Application(s) Topical <User Schedule>  enalapril 20 milliGRAM(s) Oral daily  ergocalciferol 98629 Unit(s) Oral every week  heparin  Infusion.  Unit(s)/Hr (10 mL/Hr) IV Continuous <Continuous>  influenza  Vaccine (HIGH DOSE) 0.7 milliLiter(s) IntraMuscular once  isosorbide   mononitrate ER Tablet (IMDUR) 30 milliGRAM(s) Oral daily  levothyroxine 50 MICROGram(s) Oral daily  metoprolol tartrate 12.5 milliGRAM(s) Oral every 12 hours  pantoprazole    Tablet 40 milliGRAM(s) Oral before breakfast    PRN:  heparin   Injectable 4000 Unit(s) IV Push every 6 hours PRN  heparin   Injectable 2000 Unit(s) IV Push every 6 hours PRN  melatonin Oral Tab/Cap - Peds 3 milliGRAM(s) Oral at bedtime PRN  nitroglycerin     SubLingual 0.4 milliGRAM(s) SubLingual every 5 minutes PRN      HOME MEDICATIONS:  Home Medications:  Ambien 5 mg oral tablet: 1 tab(s) orally once a day (at bedtime), As Needed (28 Sep 2022 10:16)  amLODIPine 5 mg oral tablet: 1 tab(s) orally once a day (28 Sep 2022 10:16)  apixaban 2.5 mg oral tablet: 1 tab(s) orally 2 times a day (28 Sep 2022 10:16)  Aspirin Enteric Coated 81 mg oral delayed release tablet: 1 tab(s) orally once a day (28 Sep 2022 10:16)  calcium carbonate 600 mg oral tablet, chewable: 2 tab(s) orally once a day (28 Sep 2022 10:16)  enalapril 20 mg oral tablet: 1 tab(s) orally once a day (28 Sep 2022 10:16)  ergocalciferol 1.25 mg (50,000 intl units) oral capsule: 1 cap(s) orally once a week (28 Sep 2022 10:16)  fluticasone propionate 55 mcg/inh inhalation powder: 1 puff(s) inhaled every 12 hours, As Needed (28 Sep 2022 10:16)  isosorbide mononitrate 30 mg oral tablet, extended release: 1 tab(s) orally once a day (in the morning) (28 Sep 2022 10:16)  levothyroxine 50 mcg (0.05 mg) oral tablet: 1 tab(s) orally once a day (28 Sep 2022 10:16)  Melatonin 3 mg oral tablet: 1 tab(s) orally once a day (at bedtime), As Needed (28 Sep 2022 10:16)  Metoprolol Tartrate 25 mg oral tablet: 0.5 tab(s) orally 2 times a day (28 Sep 2022 10:16)  pantoprazole 40 mg oral delayed release tablet: 1 tab(s) orally once a day (28 Sep 2022 10:16)  Theragran oral tablet: 1 tab(s) orally once a day (28 Sep 2022 10:16)      VITALS:   T(F): 97.1 ( @ 05:17), Max: 97.9 ( @ 02:54)  HR: 59 ( @ 05:17) (59 - 80)  BP: 132/62 ( @ 05:17) (124/81 - 165/87)  BP(mean): --  RR: 18 ( @ 05:17) (16 - 20)  SpO2: 97% ( @ 01:22) (97% - 99%)    I&O's Summary    28 Sep 2022 07:  -  29 Sep 2022 07:00  --------------------------------------------------------  IN: 18 mL / OUT: 700 mL / NET: -682 mL    29 Sep 2022 07:  -  29 Sep 2022 13:31  --------------------------------------------------------  IN: 70 mL / OUT: 0 mL / NET: 70 mL        REVIEW OF SYSTEMS:  CONSTITUTIONAL: No weakness, fevers or chills  HEENT: No visual changes, neck/ear pain  RESPIRATORY: No cough, sob  CARDIOVASCULAR: See HPI  GASTROINTESTINAL: No abdominal pain. No nausea, vomiting, diarrhea   GENITOURINARY: No dysuria, frequency or hematuria  NEUROLOGICAL: No new focal deficits  SKIN: No new rashes    PHYSICAL EXAM:  General: Not in distress.  Non-toxic appearing.   HEENT: EOMI  Cardio: regular, S1, S2, no murmur  Pulm: B/L BS.  No wheezing / crackles / rales  Abdomen: Soft, non-tender, non-distended. Normoactive bowel sounds  Extremities: No edema b/l le  Neuro: A&O x3. No focal deficits    LABS:                        10.7   5.93  )-----------( 156      ( 29 Sep 2022 03:42 )             31.4         144  |  108  |  39<H>  ----------------------------<  93  3.6   |  25  |  1.8<H>    Ca    8.4      29 Sep 2022 03:42  Phos  4.5       Mg     1.9         TPro  5.5<L>  /  Alb  3.6  /  TBili  0.3  /  DBili  x   /  AST  18  /  ALT  10  /  AlkPhos  51      PT/INR - ( 28 Sep 2022 05:40 )   PT: 12.70 sec;   INR: 1.11 ratio         PTT - ( 29 Sep 2022 03:42 )  PTT:75.4 sec  Troponin T, Serum: <0.01 ng/mL (22 @ 18:29)  Creatine Kinase, Serum: 72 U/L (22 @ 18:29)    CARDIAC MARKERS ( 28 Sep 2022 18:29 )  x     / <0.01 ng/mL / 72 U/L / x     / x      CARDIAC MARKERS ( 28 Sep 2022 11:21 )  x     / <0.01 ng/mL / x     / x     / x      CARDIAC MARKERS ( 28 Sep 2022 05:40 )  x     / <0.01 ng/mL / x     / x     / x            Troponin trend:       Chol 144 LDL -- HDL 68 Trig 45  COVID-19 PCR: NotDetec (28 Sep 2022 05:40)      EC Lead ECG:   Ventricular Rate 75 BPM    Atrial Rate 75 BPM    P-R Interval 168 ms    QRS Duration 58 ms    Q-T Interval 386 ms    QTC Calculation(Bazett) 431 ms    R Axis -34 degrees    T Axis -3 degrees    Diagnosis Line Normal sinus rhythm  Left axis deviation  Left anterior fascicular block  Low voltage QRS  Septal infarct , age undetermined  Inferior infarct , age undetermined  Abnormal ECG    Confirmed by SHIRLEY ORTIZ MD ([id number]) on 2022 11:57:12 AM ( @ 03:01)      TELEMETRY EVENTS:    No events on tele

## 2022-09-29 NOTE — PROGRESS NOTE ADULT - SUBJECTIVE AND OBJECTIVE BOX
OLIVA EAST  87y Female    CHIEF COMPLAINT:    Patient is a 87y old  Female who presents with a chief complaint of Palpitations (28 Sep 2022 09:04)      INTERVAL HPI/OVERNIGHT EVENTS:    Patient seen and examined.    ROS: All other systems are negative.    Vital Signs:    T(F): 97.1 (22 @ 05:17), Max: 97.8 (22 @ 16:22)  HR: 59 (22 @ 05:17) (59 - 80)  BP: 132/62 (22 @ 05:17) (124/81 - 163/84)  RR: 18 (22 @ 05:17) (16 - 18)  SpO2: 97% (22 @ 01:22) (97% - 99%)  I&O's Summary    28 Sep 2022 07:01  -  29 Sep 2022 07:00  --------------------------------------------------------  IN: 8 mL / OUT: 700 mL / NET: -692 mL      Daily Height in cm: 162.56 (29 Sep 2022 01:22)    Daily Weight in k.8 (29 Sep 2022 05:17)  CAPILLARY BLOOD GLUCOSE          PHYSICAL EXAM:    GENERAL:  NAD  SKIN: No rashes or lesions  HENT: Atraumatic. Normocephalic. PERRL. Moist membranes.  NECK: Supple, No JVD. No lymphadenopathy.  PULMONARY: CTA B/L. No wheezing. No rales  CVS: Normal S1, S2. Rate and Rhythm are regular. No murmurs.  ABDOMEN/GI: Soft, Nontender, Nondistended; BS present  EXTREMITIES: Peripheral pulses intact. No edema B/L LE.  NEUROLOGIC:  No motor or sensory deficit.  PSYCH: Alert & oriented x 3    Consultant(s) Notes Reviewed:  [x ] YES  [ ] NO  Care Discussed with Consultants/Other Providers [ x] YES  [ ] NO    EKG reviewed  Telemetry reviewed    LABS:                        10.7   5.93  )-----------( 156      ( 29 Sep 2022 03:42 )             31.4         144  |  108  |  39<H>  ----------------------------<  93  3.6   |  25  |  1.8<H>    Ca    8.4      29 Sep 2022 03:42  Phos  4.5       Mg     1.9         TPro  5.5<L>  /  Alb  3.6  /  TBili  0.3  /  DBili  x   /  AST  18  /  ALT  10  /  AlkPhos  51      PT/INR - ( 28 Sep 2022 05:40 )   PT: 12.70 sec;   INR: 1.11 ratio         PTT - ( 29 Sep 2022 03:42 )  PTT:75.4 sec    Trop <0.01, CKMB --, CK 72, 22 @ 18:29  Trop <0.01, CKMB --, CK --, 22 @ 11:21  Trop <0.01, CKMB --, CK --, 22 @ 05:40        RADIOLOGY & ADDITIONAL TESTS:      Imaging or report Personally Reviewed:  [ ] YES  [ ] NO    Medications:  Standing  amLODIPine   Tablet 5 milliGRAM(s) Oral daily  aspirin enteric coated 81 milliGRAM(s) Oral daily  calcium carbonate    500 mG (Tums) Chewable 2 Tablet(s) Chew daily  chlorhexidine 4% Liquid 1 Application(s) Topical <User Schedule>  enalapril 20 milliGRAM(s) Oral daily  ergocalciferol 17115 Unit(s) Oral every week  heparin  Infusion.  Unit(s)/Hr IV Continuous <Continuous>  influenza  Vaccine (HIGH DOSE) 0.7 milliLiter(s) IntraMuscular once  isosorbide   mononitrate ER Tablet (IMDUR) 30 milliGRAM(s) Oral daily  levothyroxine 50 MICROGram(s) Oral daily  metoprolol tartrate 12.5 milliGRAM(s) Oral every 12 hours  pantoprazole    Tablet 40 milliGRAM(s) Oral before breakfast    PRN Meds  heparin   Injectable 4000 Unit(s) IV Push every 6 hours PRN  heparin   Injectable 2000 Unit(s) IV Push every 6 hours PRN  melatonin Oral Tab/Cap - Peds 3 milliGRAM(s) Oral at bedtime PRN  nitroglycerin     SubLingual 0.4 milliGRAM(s) SubLingual every 5 minutes PRN      Case discussed with resident    Care discussed with pt/family           OLIVA EAST  87y Female    CHIEF COMPLAINT:    Patient is a 87y old  Female who presents with a chief complaint of Palpitations (28 Sep 2022 09:04)      INTERVAL HPI/OVERNIGHT EVENTS:    Patient seen and examined. C/O intermittent palpitations and cp. No sob. No dizziness.     ROS: All other systems are negative.    Vital Signs:    T(F): 97.1 (22 @ 05:17), Max: 97.8 (22 @ 16:22)  HR: 59 (22 @ 05:17) (59 - 80)  BP: 132/62 (22 @ 05:17) (124/81 - 163/84)  RR: 18 (22 @ 05:17) (16 - 18)  SpO2: 97% (22 @ 01:22) (97% - 99%)  I&O's Summary    28 Sep 2022 07:01  -  29 Sep 2022 07:00  --------------------------------------------------------  IN: 8 mL / OUT: 700 mL / NET: -692 mL      Daily Height in cm: 162.56 (29 Sep 2022 01:22)    Daily Weight in k.8 (29 Sep 2022 05:17)  CAPILLARY BLOOD GLUCOSE          PHYSICAL EXAM:    GENERAL:  NAD  SKIN: No rashes or lesions  HENT: Atraumatic. Normocephalic. PERRL. Moist membranes.  NECK: Supple, No JVD. No lymphadenopathy.  PULMONARY: CTA B/L. No wheezing. No rales  CVS: Normal S1, S2. Rate and Rhythm are regular. No murmurs.  ABDOMEN/GI: Soft, Nontender, Nondistended; BS present  EXTREMITIES: Peripheral pulses intact. No edema B/L LE.  NEUROLOGIC:  No motor or sensory deficit.  PSYCH: Alert & oriented x 3    Consultant(s) Notes Reviewed:  [x ] YES  [ ] NO  Care Discussed with Consultants/Other Providers [ x] YES  [ ] NO    EKG reviewed  Telemetry reviewed    LABS:                        10.7   5.93  )-----------( 156      ( 29 Sep 2022 03:42 )             31.4         144  |  108  |  39<H>  ----------------------------<  93  3.6   |  25  |  1.8<H>    Ca    8.4      29 Sep 2022 03:42  Phos  4.5       Mg     1.9         TPro  5.5<L>  /  Alb  3.6  /  TBili  0.3  /  DBili  x   /  AST  18  /  ALT  10  /  AlkPhos  51      PT/INR - ( 28 Sep 2022 05:40 )   PT: 12.70 sec;   INR: 1.11 ratio         PTT - ( 29 Sep 2022 03:42 )  PTT:75.4 sec    Trop <0.01, CKMB --, CK 72, 22 @ 18:29  Trop <0.01, CKMB --, CK --, 22 @ 11:21  Trop <0.01, CKMB --, CK --, 22 @ 05:40        RADIOLOGY & ADDITIONAL TESTS:      Imaging or report Personally Reviewed:  [ ] YES  [ ] NO    Medications:  Standing  amLODIPine   Tablet 5 milliGRAM(s) Oral daily  aspirin enteric coated 81 milliGRAM(s) Oral daily  calcium carbonate    500 mG (Tums) Chewable 2 Tablet(s) Chew daily  chlorhexidine 4% Liquid 1 Application(s) Topical <User Schedule>  enalapril 20 milliGRAM(s) Oral daily  ergocalciferol 22316 Unit(s) Oral every week  heparin  Infusion.  Unit(s)/Hr IV Continuous <Continuous>  influenza  Vaccine (HIGH DOSE) 0.7 milliLiter(s) IntraMuscular once  isosorbide   mononitrate ER Tablet (IMDUR) 30 milliGRAM(s) Oral daily  levothyroxine 50 MICROGram(s) Oral daily  metoprolol tartrate 12.5 milliGRAM(s) Oral every 12 hours  pantoprazole    Tablet 40 milliGRAM(s) Oral before breakfast    PRN Meds  heparin   Injectable 4000 Unit(s) IV Push every 6 hours PRN  heparin   Injectable 2000 Unit(s) IV Push every 6 hours PRN  melatonin Oral Tab/Cap - Peds 3 milliGRAM(s) Oral at bedtime PRN  nitroglycerin     SubLingual 0.4 milliGRAM(s) SubLingual every 5 minutes PRN      Case discussed with resident    Care discussed with pt/family

## 2022-09-29 NOTE — CONSULT NOTE ADULT - ASSESSMENT
pt with above hx with reported positive troponin inDsetting of afib at a heart rate of 150 bpm.  Records from [facility] still have not come over.  despite 2 hours of chest pain pt's troponins this hospital stay were negative x3 making pt's cp not likely cardiac.  p is 87 yrs old, with low gfr and new anemia on eliquis.  All these issues make a cardiac cath a concern.  Need to f/u on echo including ef, and lad size along with valvular fn.  Alost will need peak troponin when pt was ging 150 bpm. Pending this info will re address cardiac cath.  echo now  obtain prior hospital records   gilda lin.    discussed with family.

## 2022-09-29 NOTE — PROGRESS NOTE ADULT - ASSESSMENT
#chest pressure (angina?)  -Currently patient is asymptomatic, denies chest pressure or palpitations  -Nuclear stress at [facility] demonstrated a small sized defect of mild severity in the apical wall   -EKG sinus rhythm   -Troponin triple negative  -fu echo  -C/w aspirin  -C/w imdur  -Monitor on tele  -Cardiology fellow contacted (Dr Soler)    #Paroxysmal A-fib  -currently in sinus rhythm  -was on eliquis, switched to heparin drip, cardiac cath in the am  -c/w metoprolol tartrate 12.5mg q12h    #Hypothyroidism  - TSH 5.08  - ++Levothyroxine from 50mcg TO 75mcg?    #HTN  -c/w amlodipine  -c/w enalipril, can hold if creatinine is uptrending    #CKD  -Cr: 1.8 (baseline 1.5-1.8)  -trend creatinines    #DLD  -patient was started on lipitor 3 months ago but developed lower extremity edema and was told to stop the medication by her PCP    #Osteoporosis  -c/w ergocalciferol once a week      GI prophylaxis: protonix  DVT prophylaxis: heparin drip  Activity: as tolerated  Pending: possible cardiac cath, patient is NPO at midnight tonight, confirm levothyroxine dose with attending

## 2022-09-29 NOTE — PATIENT PROFILE ADULT - FALL HARM RISK - HARM RISK INTERVENTIONS

## 2022-09-29 NOTE — PROGRESS NOTE ADULT - ASSESSMENT
An 87 year old female with past medical history CKD, DLD, osteoporosis, HTN, hypothyroidism and paroxysmal a-fib (diagnosed 2 weeks ago, on Eliquis who presented to the hospital due to palpitations and mild chest pressure.      CP  Palpitations / PAF on Eliquis  CKD-4  HN / DL             PLAN: An 87 year old female with past medical history CKD, DLD, osteoporosis, HTN, hypothyroidism and paroxysmal a-fib (diagnosed 2 weeks ago, on Eliquis who presented to the hospital due to palpitations and mild chest pressure.      CP  Palpitations / PAF on Eliquis  CKD-4  HN / DL             PLAN:    ·	Cont tele  ·	CE x 3 negative  ·	Supposedly pt had positive nuclear stress test [facility]. Will obtain old record from [facility]  ·	Care d/w the pt's cardiologist. Recommended EP eval  ·	Check TSH level  ·	Cont to hold Eliquis. Cont IV Heparin and follow PTT  ·	Cont ASA, Metoprolol and her other meds.   ·	Monitor electrolytes and renal function.   ·	NPO post MN for possible cath in AM    Progress Note Handoff    Pending (specify):  Consults_EP________, Tests________, Test Results_______, Other__Cath in AM_______  Family discussion:  Disposition: Home___/SNF___/Other________/Unknown at this time________    Aren Chao MD  Spectra: [phone]

## 2022-09-30 VITALS — DIASTOLIC BLOOD PRESSURE: 82 MMHG | SYSTOLIC BLOOD PRESSURE: 164 MMHG | HEART RATE: 62 BPM

## 2022-09-30 LAB
APTT BLD: 125.2 SEC — CRITICAL HIGH (ref 27–39.2)
APTT BLD: 140.2 SEC — CRITICAL HIGH (ref 27–39.2)
HCT VFR BLD CALC: 31.2 % — LOW (ref 37–47)
HGB BLD-MCNC: 10.6 G/DL — LOW (ref 12–16)
MCHC RBC-ENTMCNC: 29.7 PG — SIGNIFICANT CHANGE UP (ref 27–31)
MCHC RBC-ENTMCNC: 34 G/DL — SIGNIFICANT CHANGE UP (ref 32–37)
MCV RBC AUTO: 87.4 FL — SIGNIFICANT CHANGE UP (ref 81–99)
NRBC # BLD: 0 /100 WBCS — SIGNIFICANT CHANGE UP (ref 0–0)
PLATELET # BLD AUTO: 151 K/UL — SIGNIFICANT CHANGE UP (ref 130–400)
RBC # BLD: 3.57 M/UL — LOW (ref 4.2–5.4)
RBC # FLD: 13.6 % — SIGNIFICANT CHANGE UP (ref 11.5–14.5)
WBC # BLD: 3.91 K/UL — LOW (ref 4.8–10.8)
WBC # FLD AUTO: 3.91 K/UL — LOW (ref 4.8–10.8)

## 2022-09-30 PROCEDURE — 99239 HOSP IP/OBS DSCHRG MGMT >30: CPT

## 2022-09-30 RX ORDER — ROSUVASTATIN CALCIUM 5 MG/1
1 TABLET ORAL
Qty: 30 | Refills: 3
Start: 2022-09-30 | End: 2023-01-27

## 2022-09-30 RX ORDER — APIXABAN 2.5 MG/1
2.5 TABLET, FILM COATED ORAL
Refills: 0 | Status: DISCONTINUED | OUTPATIENT
Start: 2022-09-30 | End: 2022-09-30

## 2022-09-30 RX ADMIN — Medication 12.5 MILLIGRAM(S): at 06:15

## 2022-09-30 RX ADMIN — PANTOPRAZOLE SODIUM 40 MILLIGRAM(S): 20 TABLET, DELAYED RELEASE ORAL at 06:16

## 2022-09-30 RX ADMIN — Medication 81 MILLIGRAM(S): at 11:53

## 2022-09-30 RX ADMIN — INFLUENZA VIRUS VACCINE 0.7 MILLILITER(S): 15; 15; 15; 15 SUSPENSION INTRAMUSCULAR at 17:17

## 2022-09-30 RX ADMIN — ISOSORBIDE MONONITRATE 30 MILLIGRAM(S): 60 TABLET, EXTENDED RELEASE ORAL at 11:55

## 2022-09-30 RX ADMIN — Medication 2 TABLET(S): at 11:54

## 2022-09-30 RX ADMIN — Medication 12.5 MILLIGRAM(S): at 17:17

## 2022-09-30 RX ADMIN — Medication 20 MILLIGRAM(S): at 06:15

## 2022-09-30 RX ADMIN — AMLODIPINE BESYLATE 5 MILLIGRAM(S): 2.5 TABLET ORAL at 06:15

## 2022-09-30 RX ADMIN — CHLORHEXIDINE GLUCONATE 1 APPLICATION(S): 213 SOLUTION TOPICAL at 12:00

## 2022-09-30 RX ADMIN — APIXABAN 2.5 MILLIGRAM(S): 2.5 TABLET, FILM COATED ORAL at 17:17

## 2022-09-30 RX ADMIN — Medication 50 MICROGRAM(S): at 06:16

## 2022-09-30 NOTE — PROGRESS NOTE ADULT - ASSESSMENT
88 yo female with hx of ckf, hyperlipidemia, htn, hypothyroidism, new pafib with nl ef, and minimal ischemia on nuclear stress.  pt had negative ce's despite 2 hours of sxs this admission.  discussed conservative medical mgt and plus and minus of a cardiac cath and opt for medical mgt  cont current meds  and d/c  home on metoprolol 12.5 po q12  add crestor 5 q24  cont eliquis and asa81 mgs  pt to go home on a biotel for 2 weeks.  discussed with house staff.

## 2022-09-30 NOTE — PROGRESS NOTE ADULT - SUBJECTIVE AND OBJECTIVE BOX
Subjective: pt feels well ambulating and denies cp nor sob.       MEDICATIONS  (STANDING):  amLODIPine   Tablet 5 milliGRAM(s) Oral daily  apixaban 2.5 milliGRAM(s) Oral two times a day  aspirin enteric coated 81 milliGRAM(s) Oral daily  calcium carbonate    500 mG (Tums) Chewable 2 Tablet(s) Chew daily  chlorhexidine 4% Liquid 1 Application(s) Topical <User Schedule>  enalapril 20 milliGRAM(s) Oral daily  ergocalciferol 11420 Unit(s) Oral every week  isosorbide   mononitrate ER Tablet (IMDUR) 30 milliGRAM(s) Oral daily  levothyroxine 50 MICROGram(s) Oral daily  metoprolol tartrate 12.5 milliGRAM(s) Oral every 12 hours  pantoprazole    Tablet 40 milliGRAM(s) Oral before breakfast    MEDICATIONS  (PRN):  melatonin Oral Tab/Cap - Peds 3 milliGRAM(s) Oral at bedtime PRN Insomnia  nitroglycerin     SubLingual 0.4 milliGRAM(s) SubLingual every 5 minutes PRN Chest Pain            Vital Signs Last 24 Hrs  T(C): 36.1 (30 Sep 2022 14:02), Max: 36.2 (29 Sep 2022 20:57)  T(F): 97 (30 Sep 2022 14:02), Max: 97.2 (29 Sep 2022 20:57)  HR: 62 (30 Sep 2022 17:15) (60 - 75)  BP: 164/82 (30 Sep 2022 17:15) (137/73 - 164/82)  BP(mean): --  RR: 18 (30 Sep 2022 14:02) (18 - 18)  SpO2: 97% (29 Sep 2022 20:57) (97% - 97%)    Parameters below as of 29 Sep 2022 20:57  Patient On (Oxygen Delivery Method): room air                 REVIEW OF SYSTEMS:  CONSTITUTIONAL: no fever, no chills, no diaphoresis  CARDIOLOGY: no chest pain, no SOB, no palpitation, no diaphoresis, no faint   RESPIRATORY: no dyspnea, no wheeze, no orthopnea, no PND   NEUROLOGICAL: no dizziness, headache, focal deficits to report.  GI: no abdominal pain, no dyspepsia, no nausea, no vomiting, no diarrhea.    HEENT: no congestion, no nasal bleeding  SKIN: no ecchymosis,             PHYSICAL EXAM:  · CONSTITUTIONAL: Looks stable, in no diress  . NECK: Supple, no JVD, no bruit on either carotid side   · RESPIRATORY: Normal air entry to lung base, no wheeze, no crackle, no wet rales  · CARDIOVASCULAR: Normal S1, A2, P2, no murmur, no click, regular rate,  no rub,  · EXTREMITIES: No cyanosis, no clubbing, no edema  · VASCULAR: Pulses are regular, equal, bilateral in upper and lower extremities  	  TELEMETRY:nsr, no arrhythmias    ECG:    TTE:from [facility] with nl ef and no significant Doppler abnormalities.     LABS:                        10.6   3.91  )-----------( 151      ( 30 Sep 2022 06:25 )             31.2     09-29    144  |  108  |  39<H>  ----------------------------<  93  3.6   |  25  |  1.8<H>    Ca    8.4      29 Sep 2022 03:42  Phos  4.5     09-29  Mg     1.9     09-29    TPro  5.5<L>  /  Alb  3.6  /  TBili  0.3  /  DBili  x   /  AST  18  /  ALT  10  /  AlkPhos  51  09-29        PTT - ( 30 Sep 2022 13:50 )  PTT:125.2 sec    I&O's Summary    29 Sep 2022 07:01  -  30 Sep 2022 07:00  --------------------------------------------------------  IN: 360 mL / OUT: 500 mL / NET: -140 mL    30 Sep 2022 07:01  -  30 Sep 2022 20:11  --------------------------------------------------------  IN: 480 mL / OUT: 400 mL / NET: 80 mL      BNP  RADIOLOGY & ADDITIONAL STUDIES:    IMPRESSION AND PLAN:

## 2022-09-30 NOTE — DISCHARGE NOTE PROVIDER - NSDCCPCAREPLAN_GEN_ALL_CORE_FT
PRINCIPAL DISCHARGE DIAGNOSIS  Diagnosis: Paroxysmal atrial fibrillation  Assessment and Plan of Treatment: Atrial Fibrillation  Atrial fibrillation is a type of irregular heartbeat (arrhythmia) where the heart quivers continuously in a chaotic pattern that makes the heart unable to pump blood normally. This can increase the risk for stroke, heart failure, and other heart-related conditions. Atrial fibrillation can be caused by a variety of conditions and may be temporary, intermittent, or permanent. Symptoms include feeling that your heart is beating rapidly or irregularly, chest discomfort, shortness of breath, or dizziness/lightheadedness that may be worse with exertion. Treatment is varied but may involve medication or electrical shock (cardioversion).  SEEK IMMEDIATE MEDICAL CARE IF YOU HAVE ANY OF THE FOLLOWING SYMPTOMS: chest pain, shortness of breath, abdominal pain, sweating, vomiting, blood in vomit/bowel movements/urine, dizziness/lightheadedness, weakness or numbness to face/arm/leg, trouble speaking or understanding, facial droop.        SECONDARY DISCHARGE DIAGNOSES  Diagnosis: Angina pectoris  Assessment and Plan of Treatment: [facility]. Kindly follow up with your cardiologist, take your meds as prescribed and follow up with your other doctors as suggested.  Go to the [facility] ED for uncontrolled chest pain.

## 2022-09-30 NOTE — DISCHARGE NOTE PROVIDER - CARE PROVIDERS DIRECT ADDRESSES
[email or web address]

## 2022-09-30 NOTE — PROGRESS NOTE ADULT - SUBJECTIVE AND OBJECTIVE BOX
OLIVA EAST  87y Female    CHIEF COMPLAINT:    Patient is a 87y old  Female who presents with a chief complaint of Palpitations (30 Sep 2022 11:11)      INTERVAL HPI/OVERNIGHT EVENTS:    Patient seen and examined. Feels better. No cp. No palpitations. No sob    ROS: All other systems are negative.    Vital Signs:    T(F): 96.8 (22 @ 05:32), Max: 97.2 (22 @ 20:57)  HR: 60 (22 @ 05:32) (60 - 75)  BP: 158/74 (22 @ 05:32) (145/65 - 158/74)  RR: 18 (22 @ 05:32) (18 - 18)  SpO2: 97% (22 @ 20:57) (97% - 97%)  I&O's Summary    29 Sep 2022 07:01  -  30 Sep 2022 07:00  --------------------------------------------------------  IN: 360 mL / OUT: 500 mL / NET: -140 mL    30 Sep 2022 07:01  -  30 Sep 2022 11:32  --------------------------------------------------------  IN: 240 mL / OUT: 200 mL / NET: 40 mL      Daily     Daily Weight in k.8 (30 Sep 2022 05:32)  CAPILLARY BLOOD GLUCOSE          PHYSICAL EXAM:    GENERAL:  NAD  SKIN: No rashes or lesions  HENT: Atraumatic. Normocephalic. PERRL. Moist membranes.  NECK: Supple, No JVD. No lymphadenopathy.  PULMONARY: CTA B/L. No wheezing. No rales  CVS: Normal S1, S2. Rate and Rhythm are regular. No murmurs.  ABDOMEN/GI: Soft, Nontender, Nondistended; BS present  EXTREMITIES: Peripheral pulses intact. No edema B/L LE.  NEUROLOGIC:  No motor or sensory deficit.  PSYCH: Alert & oriented x 3    Consultant(s) Notes Reviewed:  [x ] YES  [ ] NO  Care Discussed with Consultants/Other Providers [ x] YES  [ ] NO    EKG reviewed  Telemetry reviewed    LABS:                        10.6   3.91  )-----------( 151      ( 30 Sep 2022 06:25 )             31.2         144  |  108  |  39<H>  ----------------------------<  93  3.6   |  25  |  1.8<H>    Ca    8.4      29 Sep 2022 03:42  Phos  4.5       Mg     1.9         TPro  5.5<L>  /  Alb  3.6  /  TBili  0.3  /  DBili  x   /  AST  18  /  ALT  10  /  AlkPhos  51      PTT - ( 30 Sep 2022 06:25 )  PTT:140.2 sec    Trop <0.01, CKMB --, CK 72, 22 @ 18:29  Trop <0.01, CKMB --, CK --, 22 @ 11:21  Trop <0.01, CKMB --, CK --, 22 @ 05:40        RADIOLOGY & ADDITIONAL TESTS:      Imaging or report Personally Reviewed:  [ ] YES  [ ] NO    Medications:  Standing  amLODIPine   Tablet 5 milliGRAM(s) Oral daily  apixaban 2.5 milliGRAM(s) Oral two times a day  aspirin enteric coated 81 milliGRAM(s) Oral daily  calcium carbonate    500 mG (Tums) Chewable 2 Tablet(s) Chew daily  chlorhexidine 4% Liquid 1 Application(s) Topical <User Schedule>  enalapril 20 milliGRAM(s) Oral daily  ergocalciferol 10188 Unit(s) Oral every week  influenza  Vaccine (HIGH DOSE) 0.7 milliLiter(s) IntraMuscular once  isosorbide   mononitrate ER Tablet (IMDUR) 30 milliGRAM(s) Oral daily  levothyroxine 50 MICROGram(s) Oral daily  metoprolol tartrate 12.5 milliGRAM(s) Oral every 12 hours  pantoprazole    Tablet 40 milliGRAM(s) Oral before breakfast    PRN Meds  melatonin Oral Tab/Cap - Peds 3 milliGRAM(s) Oral at bedtime PRN  nitroglycerin     SubLingual 0.4 milliGRAM(s) SubLingual every 5 minutes PRN      Case discussed with resident    Care discussed with pt/family

## 2022-09-30 NOTE — DISCHARGE NOTE PROVIDER - HOSPITAL COURSE
The patient is an 87 year old female with past medical history CKD, DLD, osteoporosis, HTN, hypothyroidism and new onset a-fib (on eliquis) who presented to the hospital due to palpitations and mild chest pressure.  Patient was recently admitted to [facility] due to palpitations and new onset a-fib, nuclear stress test on 9/26 was done there and demonstrated a left ventricular ejection fraction of >70% with a small sized defect of mild severity in the apical wall that is reversible suggestive of ischemia. Her trop were triple negative and we were able t attain records from [facility].    Pt had no events on tele while she was in the hospital. She continued to receive aspirin, metoprolol and other meds while she was in the hospital    #Cont tele  CE x 3 negative  Supposedly pt had positive nuclear stress test [facility]. Will obtain old record from [facility]  Care d/w the pt's cardiologist. Recommended EP eval  Check TSH level  Cont to hold Eliquis. Cont IV Heparin and follow PTT  Cont ASA, Metoprolol and her other meds.   Monitor electrolytes and renal function.   NPO post MN for possible cath in AM      # History of a fib - CHADSAVSC 4  ECG reviewed - [facility]B. Sinus  No a fib on tele and patient having palpitations. Unlikely related to arrhythmia   # Reversible ischemia on stress test     Recommendations   - Continue full AC   - Continue metoprolol for rate control   - Get records from [facility] (echo and stress test)   - Cardiology follow up for ischemic workup    The patient is an 87 year old female with past medical history CKD, DLD, osteoporosis, HTN, hypothyroidism and new onset a-fib (on eliquis) who presented to the hospital due to palpitations and mild chest pressure.  Patient was recently admitted to [facility] due to palpitations and new onset a-fib, nuclear stress test on 9/26 was done there and demonstrated a left ventricular ejection fraction of >70% with a small sized defect of mild severity in the apical wall that is reversible suggestive of ischemia. Her trop were triple negative and we were able t attain records from [facility].  ECHO showed NLVF and nuclear stress test showed a small apical reversible defect  Care d/w the cardiologist. Recommended medical management   Pt advised to  Biotel device from the cardiology office today    Pt had no events on tele while she was in the hospital. She continued to receive aspirin, metoprolol and other meds while she was in the hospital    Recommendations   - Continue full AC   - Continue metoprolol for rate control

## 2022-09-30 NOTE — PROGRESS NOTE ADULT - ASSESSMENT
An 87 year old female with past medical history CKD, DLD, osteoporosis, HTN, hypothyroidism and paroxysmal a-fib (diagnosed 2 weeks ago, on Eliquis who presented to the hospital due to palpitations and mild chest pressure.      CP likely due to stable angina.   Palpitations / PAF on Eliquis  CKD-4  HN / DL             PLAN:    ·	No events on tele  ·	CE x 3 negative  ·	Old record from the pt's recent admission at [facility] reviewed. ECHO showed NLVF and nuclear stress test showed a small apical reversible defect  ·	Care d/w the cardiologist. Recommended medical management   ·	D/C IV Heparin. Restart Eliquis 2.5 mg po q 12h  ·	Cont ASA, Metoprolol and her other meds. Add Rosuvastatin 5 mg po daily  ·	Cardiology recommended to  Biotel device from the office today  ·	D/C home    * Med rec reviewed. Plan of care d/w the pt and her daughter on bedside. Time spent 41 minutes.

## 2022-09-30 NOTE — DISCHARGE NOTE PROVIDER - NSDCMRMEDTOKEN_GEN_ALL_CORE_FT
Ambien 5 mg oral tablet: 1 tab(s) orally once a day (at bedtime), As Needed  amLODIPine 5 mg oral tablet: 1 tab(s) orally once a day  apixaban 2.5 mg oral tablet: 1 tab(s) orally 2 times a day  Aspirin Enteric Coated 81 mg oral delayed release tablet: 1 tab(s) orally once a day  calcium carbonate 600 mg oral tablet, chewable: 2 tab(s) orally once a day  enalapril 20 mg oral tablet: 1 tab(s) orally once a day  ergocalciferol 1.25 mg (50,000 intl units) oral capsule: 1 cap(s) orally once a week  fluticasone propionate 55 mcg/inh inhalation powder: 1 puff(s) inhaled every 12 hours, As Needed  isosorbide mononitrate 30 mg oral tablet, extended release: 1 tab(s) orally once a day (in the morning)  levothyroxine 50 mcg (0.05 mg) oral tablet: 1 tab(s) orally once a day  Melatonin 3 mg oral tablet: 1 tab(s) orally once a day (at bedtime), As Needed  Metoprolol Tartrate 25 mg oral tablet: 0.5 tab(s) orally 2 times a day  pantoprazole 40 mg oral delayed release tablet: 1 tab(s) orally once a day  Theragran oral tablet: 1 tab(s) orally once a day   Ambien 5 mg oral tablet: 1 tab(s) orally once a day (at bedtime), As Needed  amLODIPine 5 mg oral tablet: 1 tab(s) orally once a day  apixaban 2.5 mg oral tablet: 1 tab(s) orally 2 times a day  Aspirin Enteric Coated 81 mg oral delayed release tablet: 1 tab(s) orally once a day  calcium carbonate 600 mg oral tablet, chewable: 2 tab(s) orally once a day  enalapril 20 mg oral tablet: 1 tab(s) orally once a day  ergocalciferol 1.25 mg (50,000 intl units) oral capsule: 1 cap(s) orally once a week  fluticasone propionate 55 mcg/inh inhalation powder: 1 puff(s) inhaled every 12 hours, As Needed  isosorbide mononitrate 30 mg oral tablet, extended release: 1 tab(s) orally once a day (in the morning)  levothyroxine 50 mcg (0.05 mg) oral tablet: 1 tab(s) orally once a day  Melatonin 3 mg oral tablet: 1 tab(s) orally once a day (at bedtime), As Needed  Metoprolol Tartrate 25 mg oral tablet: 0.5 tab(s) orally 2 times a day  pantoprazole 40 mg oral delayed release tablet: 1 tab(s) orally once a day  rosuvastatin 5 mg oral tablet: 1 tab(s) orally once a day (at bedtime)   Theragran oral tablet: 1 tab(s) orally once a day

## 2022-09-30 NOTE — DISCHARGE NOTE PROVIDER - PROVIDER TOKENS
PROVIDER:[TOKEN:[[id number]],FOLLOWUP:[1 week]],PROVIDER:[TOKEN:[[id number]],FOLLOWUP:[1 week]]

## 2022-09-30 NOTE — DISCHARGE NOTE NURSING/CASE MANAGEMENT/SOCIAL WORK - NSDCVIVACCINE_GEN_ALL_CORE_FT
No Vaccines Administered. influenza, high-dose, quadrivalent; 30-Sep-2022 17:17; Louissaint, Nancy (RN); [employer]; [id number] (Exp. Date: 30-Jun-2023); IntraMuscular; Deltoid Left.; 0.7 milliLiter(s); VIS (VIS Published: 06-Aug-2021, VIS Presented: 30-Sep-2022);

## 2022-09-30 NOTE — DISCHARGE NOTE NURSING/CASE MANAGEMENT/SOCIAL WORK - PATIENT PORTAL LINK FT
You can access the FollowMyHealth Patient Portal offered by [facility] by registering at the following website: [email or web address]. By joining [employer]’s FollowMyHealth portal, you will also be able to view your health information using other applications (apps) compatible with our system.

## 2022-09-30 NOTE — DISCHARGE NOTE NURSING/CASE MANAGEMENT/SOCIAL WORK - NSDCPEFALRISK_GEN_ALL_CORE
For information on Fall & Injury Prevention, visit: [email or web address] OR  [email or web address] OR  https://www.cdc.gov/steadi/patient.html

## 2022-09-30 NOTE — DISCHARGE NOTE PROVIDER - CARE PROVIDER_API CALL
Tiara Tafoya)  Cardiovascular Disease; Internal Medicine  [address], [address]  [place], [place] [address]  Phone: [phone]  Fax: [phone]  Follow Up Time: 1 week    Valerio Spann  CARDIOVASCULAR DISEASE  [address] [place] [address] [address]  [place] [address]  Phone: [phone]  Fax: [phone]  Follow Up Time: 1 week

## 2022-10-05 DIAGNOSIS — I48.0 PAROXYSMAL ATRIAL FIBRILLATION: ICD-10-CM

## 2022-10-05 DIAGNOSIS — Z20.822 CONTACT WITH AND (SUSPECTED) EXPOSURE TO COVID-19: ICD-10-CM

## 2022-10-05 DIAGNOSIS — Z79.890 HORMONE REPLACEMENT THERAPY: ICD-10-CM

## 2022-10-05 DIAGNOSIS — F41.9 ANXIETY DISORDER, UNSPECIFIED: ICD-10-CM

## 2022-10-05 DIAGNOSIS — E03.9 HYPOTHYROIDISM, UNSPECIFIED: ICD-10-CM

## 2022-10-05 DIAGNOSIS — R00.2 PALPITATIONS: ICD-10-CM

## 2022-10-05 DIAGNOSIS — N18.4 CHRONIC KIDNEY DISEASE, STAGE 4 (SEVERE): ICD-10-CM

## 2022-10-05 DIAGNOSIS — Z79.82 LONG TERM (CURRENT) USE OF ASPIRIN: ICD-10-CM

## 2022-10-05 DIAGNOSIS — E78.5 HYPERLIPIDEMIA, UNSPECIFIED: ICD-10-CM

## 2022-10-05 DIAGNOSIS — M81.0 AGE-RELATED OSTEOPOROSIS WITHOUT CURRENT PATHOLOGICAL FRACTURE: ICD-10-CM

## 2022-10-05 DIAGNOSIS — Z96.651 PRESENCE OF RIGHT ARTIFICIAL KNEE JOINT: ICD-10-CM

## 2022-10-05 DIAGNOSIS — I12.9 HYPERTENSIVE CHRONIC KIDNEY DISEASE WITH STAGE 1 THROUGH STAGE 4 CHRONIC KIDNEY DISEASE, OR UNSPECIFIED CHRONIC KIDNEY DISEASE: ICD-10-CM

## 2022-10-05 DIAGNOSIS — I20.8 OTHER FORMS OF ANGINA PECTORIS: ICD-10-CM

## 2022-11-17 NOTE — PATIENT PROFILE ADULT - NSTOBACCONEVERSMOKERY/N_GEN_A
Daily Note     Today's date: 2022  Patient name: Vickie Corbin  : 1963  MRN: [id number]  Referring provider: González Cha MD  Dx:   Encounter Diagnosis     ICD-10-CM    1  Acute pain of left knee  M25 562       2  Primary osteoarthritis of left knee  M17 12       3  Insufficiency fracture of tibia with routine healing, subsequent encounter  M84 709D       4  Tear of medial meniscus of left knee, current, unspecified tear type, subsequent encounter  S83 587D                      Subjective: Pt noted having some sorness upon arrival for treatment session  Pt noted that she has a follow up with her MD on   Objective: See treatment diary below      Assessment: Continued with treatment session, unable to complete all SLR secondary to increase muscle soreness  Tolerated treatment fairly well  Patient exhibited good technique with therapeutic exercises and would benefit from continued PT  S/p treatment session, Pt noted having some soreness still in her L knee but noted that ice has helped a little bit  Plan: Continue per plan of care        Precautions: DM  Per ortho orders: "NWB ROM and strengthening   Progress to WB strengthening as tolerated based on pain in 2 weeks"    Access Code: [email or web address]       Manuals 11/10 11/14 11/17          L knee PROM             L patellar mobility  BE NV                                    Neuro Re-Ed             Quad set with towel  HEP  5" 2x10 5" 10x           Supine SLR with quad set   2x10 10x           Bridges  5" 2x10           SAQ    5" 2x10 5" 2x 10                                                  Ther Ex             Nustep  L3 10' No tension bike 10 min            Heel slides with strap             Hamstring stretch at step HEP supine Supine 3x30" Supine 3x 30"           Prostretch  HEP long sitting Long sitting 3x30" Long sit 3x 30"           Matrix knee ext             Matrix hamstring curl             Clamshells Pt education PT POC, HEP, imaging, anatomy, physiology  DOMS                        Ther Activity                                       Gait Training                                       Modalities             CP    8 min No

## 2022-11-25 ENCOUNTER — INPATIENT (INPATIENT)
Facility: HOSPITAL | Age: 87
LOS: 2 days | Discharge: HOME | End: 2022-11-28
Attending: INTERNAL MEDICINE | Admitting: INTERNAL MEDICINE
Payer: MEDICARE

## 2022-11-25 VITALS
DIASTOLIC BLOOD PRESSURE: 67 MMHG | OXYGEN SATURATION: 98 % | RESPIRATION RATE: 18 BRPM | TEMPERATURE: 96 F | SYSTOLIC BLOOD PRESSURE: 141 MMHG | HEIGHT: 64 IN | HEART RATE: 72 BPM

## 2022-11-25 DIAGNOSIS — Z96.651 PRESENCE OF RIGHT ARTIFICIAL KNEE JOINT: Chronic | ICD-10-CM

## 2022-11-25 DIAGNOSIS — I13.0 HYPERTENSIVE HEART AND CHRONIC KIDNEY DISEASE WITH HEART FAILURE AND STAGE 1 THROUGH STAGE 4 CHRONIC KIDNEY DISEASE, OR UNSPECIFIED CHRONIC KIDNEY DISEASE: ICD-10-CM

## 2022-11-25 DIAGNOSIS — Z86.16 PERSONAL HISTORY OF COVID-19: ICD-10-CM

## 2022-11-25 DIAGNOSIS — I50.31 ACUTE DIASTOLIC (CONGESTIVE) HEART FAILURE: ICD-10-CM

## 2022-11-25 PROBLEM — E03.9 HYPOTHYROIDISM, UNSPECIFIED: Chronic | Status: ACTIVE | Noted: 2022-09-28

## 2022-11-25 PROBLEM — I10 ESSENTIAL (PRIMARY) HYPERTENSION: Chronic | Status: ACTIVE | Noted: 2022-09-28

## 2022-11-25 PROBLEM — I48.91 UNSPECIFIED ATRIAL FIBRILLATION: Chronic | Status: ACTIVE | Noted: 2022-09-28

## 2022-11-25 PROBLEM — N18.9 CHRONIC KIDNEY DISEASE, UNSPECIFIED: Chronic | Status: ACTIVE | Noted: 2022-09-28

## 2022-11-25 PROBLEM — E78.5 HYPERLIPIDEMIA, UNSPECIFIED: Chronic | Status: ACTIVE | Noted: 2022-09-28

## 2022-11-25 LAB
ALBUMIN SERPL ELPH-MCNC: 4.1 G/DL — SIGNIFICANT CHANGE UP (ref 3.5–5.2)
ALP SERPL-CCNC: 53 U/L — SIGNIFICANT CHANGE UP (ref 30–115)
ALT FLD-CCNC: 12 U/L — SIGNIFICANT CHANGE UP (ref 0–41)
ANION GAP SERPL CALC-SCNC: 8 MMOL/L — SIGNIFICANT CHANGE UP (ref 7–14)
APTT BLD: 30.4 SEC — SIGNIFICANT CHANGE UP (ref 27–39.2)
AST SERPL-CCNC: 20 U/L — SIGNIFICANT CHANGE UP (ref 0–41)
BASOPHILS # BLD AUTO: 0.01 K/UL — SIGNIFICANT CHANGE UP (ref 0–0.2)
BASOPHILS NFR BLD AUTO: 0.2 % — SIGNIFICANT CHANGE UP (ref 0–1)
BILIRUB SERPL-MCNC: 0.3 MG/DL — SIGNIFICANT CHANGE UP (ref 0.2–1.2)
BUN SERPL-MCNC: 50 MG/DL — HIGH (ref 10–20)
CALCIUM SERPL-MCNC: 9 MG/DL — SIGNIFICANT CHANGE UP (ref 8.4–10.5)
CHLORIDE SERPL-SCNC: 103 MMOL/L — SIGNIFICANT CHANGE UP (ref 98–110)
CO2 SERPL-SCNC: 28 MMOL/L — SIGNIFICANT CHANGE UP (ref 17–32)
CREAT SERPL-MCNC: 2.1 MG/DL — HIGH (ref 0.7–1.5)
EGFR: 23 ML/MIN/1.73M2 — LOW
EOSINOPHIL # BLD AUTO: 0.04 K/UL — SIGNIFICANT CHANGE UP (ref 0–0.7)
EOSINOPHIL NFR BLD AUTO: 0.8 % — SIGNIFICANT CHANGE UP (ref 0–8)
GLUCOSE SERPL-MCNC: 162 MG/DL — HIGH (ref 70–99)
HCT VFR BLD CALC: 35 % — LOW (ref 37–47)
HGB BLD-MCNC: 11.3 G/DL — LOW (ref 12–16)
IMM GRANULOCYTES NFR BLD AUTO: 0.2 % — SIGNIFICANT CHANGE UP (ref 0.1–0.3)
INR BLD: 1.08 RATIO — SIGNIFICANT CHANGE UP (ref 0.65–1.3)
LYMPHOCYTES # BLD AUTO: 1.26 K/UL — SIGNIFICANT CHANGE UP (ref 1.2–3.4)
LYMPHOCYTES # BLD AUTO: 23.8 % — SIGNIFICANT CHANGE UP (ref 20.5–51.1)
MCHC RBC-ENTMCNC: 28.8 PG — SIGNIFICANT CHANGE UP (ref 27–31)
MCHC RBC-ENTMCNC: 32.3 G/DL — SIGNIFICANT CHANGE UP (ref 32–37)
MCV RBC AUTO: 89.1 FL — SIGNIFICANT CHANGE UP (ref 81–99)
MONOCYTES # BLD AUTO: 0.52 K/UL — SIGNIFICANT CHANGE UP (ref 0.1–0.6)
MONOCYTES NFR BLD AUTO: 9.8 % — HIGH (ref 1.7–9.3)
NEUTROPHILS # BLD AUTO: 3.46 K/UL — SIGNIFICANT CHANGE UP (ref 1.4–6.5)
NEUTROPHILS NFR BLD AUTO: 65.2 % — SIGNIFICANT CHANGE UP (ref 42.2–75.2)
NRBC # BLD: 0 /100 WBCS — SIGNIFICANT CHANGE UP (ref 0–0)
NT-PROBNP SERPL-SCNC: 1233 PG/ML — HIGH (ref 0–300)
PLATELET # BLD AUTO: 205 K/UL — SIGNIFICANT CHANGE UP (ref 130–400)
POTASSIUM SERPL-MCNC: 5.2 MMOL/L — HIGH (ref 3.5–5)
POTASSIUM SERPL-SCNC: 5.2 MMOL/L — HIGH (ref 3.5–5)
PROT SERPL-MCNC: 6.3 G/DL — SIGNIFICANT CHANGE UP (ref 6–8)
PROTHROM AB SERPL-ACNC: 12.3 SEC — SIGNIFICANT CHANGE UP (ref 9.95–12.87)
RBC # BLD: 3.93 M/UL — LOW (ref 4.2–5.4)
RBC # FLD: 13.1 % — SIGNIFICANT CHANGE UP (ref 11.5–14.5)
SARS-COV-2 RNA SPEC QL NAA+PROBE: DETECTED
SODIUM SERPL-SCNC: 139 MMOL/L — SIGNIFICANT CHANGE UP (ref 135–146)
TROPONIN T SERPL-MCNC: <0.01 NG/ML — SIGNIFICANT CHANGE UP
WBC # BLD: 5.3 K/UL — SIGNIFICANT CHANGE UP (ref 4.8–10.8)
WBC # FLD AUTO: 5.3 K/UL — SIGNIFICANT CHANGE UP (ref 4.8–10.8)

## 2022-11-25 PROCEDURE — 99223 1ST HOSP IP/OBS HIGH 75: CPT

## 2022-11-25 PROCEDURE — 93010 ELECTROCARDIOGRAM REPORT: CPT

## 2022-11-25 PROCEDURE — 99285 EMERGENCY DEPT VISIT HI MDM: CPT | Mod: FS,CS

## 2022-11-25 PROCEDURE — 71045 X-RAY EXAM CHEST 1 VIEW: CPT | Mod: 26

## 2022-11-25 RX ORDER — SENNA PLUS 8.6 MG/1
2 TABLET ORAL AT BEDTIME
Refills: 0 | Status: DISCONTINUED | OUTPATIENT
Start: 2022-11-25 | End: 2022-11-28

## 2022-11-25 RX ORDER — AMLODIPINE BESYLATE 2.5 MG/1
2 TABLET ORAL
Qty: 0 | Refills: 0 | DISCHARGE

## 2022-11-25 RX ORDER — AMLODIPINE BESYLATE 2.5 MG/1
5 TABLET ORAL DAILY
Refills: 0 | Status: DISCONTINUED | OUTPATIENT
Start: 2022-11-25 | End: 2022-11-25

## 2022-11-25 RX ORDER — SODIUM ZIRCONIUM CYCLOSILICATE 10 G/10G
10 POWDER, FOR SUSPENSION ORAL ONCE
Refills: 0 | Status: DISCONTINUED | OUTPATIENT
Start: 2022-11-25 | End: 2022-11-25

## 2022-11-25 RX ORDER — AMLODIPINE BESYLATE 2.5 MG/1
10 TABLET ORAL ONCE
Refills: 0 | Status: COMPLETED | OUTPATIENT
Start: 2022-11-26 | End: 2022-11-26

## 2022-11-25 RX ORDER — ZOLPIDEM TARTRATE 10 MG/1
5 TABLET ORAL AT BEDTIME
Refills: 0 | Status: DISCONTINUED | OUTPATIENT
Start: 2022-11-25 | End: 2022-11-28

## 2022-11-25 RX ORDER — ATORVASTATIN CALCIUM 80 MG/1
20 TABLET, FILM COATED ORAL AT BEDTIME
Refills: 0 | Status: DISCONTINUED | OUTPATIENT
Start: 2022-11-25 | End: 2022-11-28

## 2022-11-25 RX ORDER — AMLODIPINE BESYLATE 2.5 MG/1
10 TABLET ORAL AT BEDTIME
Refills: 0 | Status: DISCONTINUED | OUTPATIENT
Start: 2022-11-25 | End: 2022-11-28

## 2022-11-25 RX ORDER — APIXABAN 2.5 MG/1
2.5 TABLET, FILM COATED ORAL
Refills: 0 | Status: DISCONTINUED | OUTPATIENT
Start: 2022-11-25 | End: 2022-11-28

## 2022-11-25 RX ORDER — ASPIRIN/CALCIUM CARB/MAGNESIUM 324 MG
81 TABLET ORAL DAILY
Refills: 0 | Status: DISCONTINUED | OUTPATIENT
Start: 2022-11-25 | End: 2022-11-28

## 2022-11-25 RX ORDER — LEVOTHYROXINE SODIUM 125 MCG
50 TABLET ORAL DAILY
Refills: 0 | Status: DISCONTINUED | OUTPATIENT
Start: 2022-11-25 | End: 2022-11-28

## 2022-11-25 RX ORDER — METOPROLOL TARTRATE 50 MG
12.5 TABLET ORAL
Refills: 0 | Status: DISCONTINUED | OUTPATIENT
Start: 2022-11-25 | End: 2022-11-28

## 2022-11-25 RX ORDER — FUROSEMIDE 40 MG
40 TABLET ORAL ONCE
Refills: 0 | Status: COMPLETED | OUTPATIENT
Start: 2022-11-25 | End: 2022-11-25

## 2022-11-25 RX ORDER — ISOSORBIDE MONONITRATE 60 MG/1
30 TABLET, EXTENDED RELEASE ORAL DAILY
Refills: 0 | Status: DISCONTINUED | OUTPATIENT
Start: 2022-11-25 | End: 2022-11-28

## 2022-11-25 RX ORDER — AMLODIPINE BESYLATE 2.5 MG/1
1 TABLET ORAL
Qty: 0 | Refills: 0 | DISCHARGE

## 2022-11-25 RX ADMIN — Medication 12.5 MILLIGRAM(S): at 21:23

## 2022-11-25 RX ADMIN — Medication 40 MILLIGRAM(S): at 21:23

## 2022-11-25 RX ADMIN — APIXABAN 2.5 MILLIGRAM(S): 2.5 TABLET, FILM COATED ORAL at 21:22

## 2022-11-25 RX ADMIN — AMLODIPINE BESYLATE 5 MILLIGRAM(S): 2.5 TABLET ORAL at 21:22

## 2022-11-25 NOTE — ED PROVIDER NOTE - NS ED ROS FT
Review of Systems:  	•	CONSTITUTIONAL - no fever, no diaphoresis, no chills  	•	SKIN - no rash  	•	HEMATOLOGIC - no bleeding, no bruising  	•	EYES - no eye pain, no blurry vision  	•	ENT - no congestion  	•	RESPIRATORY - no shortness of breath, no cough  	•	CARDIAC - + chest pain, +htn, no palpitations  	•	GI - no abd pain, no nausea, no vomiting, no diarrhea, no constipation  	•	GENITO-URINARY - no dysuria; no hematuria, no increased urinary frequency  	•	MUSCULOSKELETAL - no joint paint, no swelling, no redness  	•	NEUROLOGIC - no weakness, no headache, no paresthesias, no LOC  	•	PSYCH - no anxiety, no depression  	All other ROS are negative except as documented in HPI.

## 2022-11-25 NOTE — H&P ADULT - HISTORY OF PRESENT ILLNESS
Ms Jolley is an 87 DANY w a PMH of CKD IIIb(makes urine), unsure of baseline Cr), HTN, dyslipidemia, hypothyroid, a recently diagnosed Afib w rvr (Aug 2022, on eliquis) and a small apical wall septal infarct (sept 26, seen by Meir) presents to the ED w 3 days of progressively worsening substernal chest pressure and home hypertensive readings of 170/99. Patient reports she was sitting on her couch on wednesday when she suddenly felt chest pressure which lasted several minutes. The pressure occured again when she was walking in the street. By Thursday the pressure was more present then not and today the CP was constant (though not present at time of interview). Patient reports she had COVID 2 weeks ago and since then her BP has been high and she has had "chest heaviness while walking. Patient reports that she has not missed a single day of medications in the past 3 months, has not had palpitations since August and has not had SOB for 1 week. Patient denies: nausea, diaphoresis,neck/jaw/arm pain, SOB (not since 1st week of COVID), changes in vision, feeling of impending doom. She has not traveled recently, denies leg cramping or swelling or pain of any kind at this time.      ENALAPRIL 20 HELD FOR HyperKalemia

## 2022-11-25 NOTE — H&P ADULT - NSHPPHYSICALEXAM_GEN_ALL_CORE
T(C): 36.2 (11-25-22 @ 15:56), Max: 36.2 (11-25-22 @ 15:56)  HR: 67 (11-25-22 @ 15:56) (67 - 72)  BP: 165/77 (11-25-22 @ 15:56) (141/67 - 165/77)  RR: 18 (11-25-22 @ 15:56) (18 - 18)  SpO2: 97% (11-25-22 @ 15:56) (97% - 98%)    CONSTITUTIONAL: Well groomed, no apparent distress  EYES: PERRLA and symmetric, No conjunctival or scleral injection, non-icteric  ENMT: Oral mucosa with moist membranes. Normal dentition; no pharyngeal injection or exudates             NECK: bounding Carotid pulses,  without tracheal deviation   RESP: No respiratory distress, no use of accessory muscles; BL CTA, No basilar crackles  CV: RRR, +S1S2, no murmurs; +1 BL pitting edema 1/2 way up shins   GI: Soft, NT, ND, no rebound, no guarding; no palpable masses; no hepatosplenomegaly; no hernia palpated  LYMPH: No cervical LAD or tenderness  MSK: Normal gait; BL boutoniers difformity, left sided bunion on foot  SKIN: No rashes or ulcers noted; BL LE varicosities   NEURO: CN II-XII intact; A&Ox4

## 2022-11-25 NOTE — ED PROVIDER NOTE - ATTENDING APP SHARED VISIT CONTRIBUTION OF CARE
87-year-old female with past medical history of CKD, hyperlipidemia, hypertension, hypothyroidism, A. fib on Eliquis presents to ED for chest pain.  Patient's had 2 episodes since yesterday.  She had a stress test on September 26 which demonstrated ejection fraction of 70% with a small defect of severity in the apical wall suggestive of ischemia.  She follows up with over who wants to manage her conservatively at this time due to age.  No shortness of breath no fever no chills no cough.    Const: NAD  Eyes: PERRL, no conjunctival injection  HENT:  Neck supple without meningismus   CV: RRR, Warm, well-perfused extremities  RESP: CTA B/L, no tachypnea   GI: soft, non-tender, non-distended  MSK: No gross deformities appreciated  Skin: Warm, dry. No rashes  Neuro: Alert, CNs II-XII grossly intact. Sensation and motor function of extremities grossly intact.  Psych: Appropriate mood and affect.    labs, cxr, cardiology

## 2022-11-25 NOTE — H&P ADULT - NSHPLABSRESULTS_GEN_ALL_CORE
Complete Blood Count + Automated Diff (11.25.22 @ 15:28)   WBC Count: 5.30 K/uL   RBC Count: 3.93 M/uL   Hemoglobin: 11.3 g/dL   Hematocrit: 35.0 %   Mean Cell Volume: 89.1 fL   Mean Cell Hemoglobin: 28.8 pg   Mean Cell Hemoglobin Conc: 32.3 g/dL   Red Cell Distrib Width: 13.1 %   Platelet Count - Automated: 205 K/uL   Auto Neutrophil #: 3.46 K/uL   Auto Lymphocyte #: 1.26 K/uL   Auto Monocyte #: 0.52 K/uL   Auto Eosinophil #: 0.04 K/uL   Auto Basophil #: 0.01 K/uL   Auto Neutrophil %: 65.2: Differential percentages must be correlated with absolute numbers for   clinical significance. %   Auto Lymphocyte %: 23.8 %   Auto Monocyte %: 9.8 %   Auto Eosinophil %: 0.8 %   Auto Basophil %: 0.2 %   Auto Immature Granulocyte %: 0.2    Comprehensive Metabolic Panel (11.25.22 @ 15:28)   Sodium, Serum: 139 mmol/L   Potassium, Serum: 5.2 mmol/L   Chloride, Serum: 103 mmol/L   Carbon Dioxide, Serum: 28 mmol/L   Anion Gap, Serum: 8 mmol/L   Blood Urea Nitrogen, Serum: 50 mg/dL   Creatinine, Serum: 2.1 mg/dL   Glucose, Serum: 162 mg/dL   Calcium, Total Serum: 9.0 mg/dL   Protein Total, Serum: 6.3 g/dL   Albumin, Serum: 4.1 g/dL   Bilirubin Total, Serum: 0.3 mg/dL   Alkaline Phosphatase, Serum: 53 U/L   Aspartate Aminotransferase (AST/SGOT): 20 U/L   Alanine Aminotransferase (ALT/SGPT): 12 U/L   eGFR: 23    Troponin T, Serum (11.25.22 @ 15:28)   Troponin T, Serum: <0.01 ng/mL     < from: 12 Lead ECG (11.25.22 @ 13:37) >    Ventricular Rate 68 BPM    Atrial Rate 68 BPM    P-R Interval 172 ms    QRS Duration 68 ms    Q-T Interval 382 ms    QTC Calculation(Bazett) 406 ms    P Axis 74 degrees    R Axis 94 degrees    T Axis 50 degrees    Diagnosis Line Normal sinus rhythm  Rightward axis  Borderline ECG    < end of copied text >

## 2022-11-25 NOTE — ED ADULT NURSE NOTE - OBJECTIVE STATEMENT
pt presents to ED co CP. pt has stress test 2wks ago: 70% EF. Pt AAox4, speaking in clear and complete sentences

## 2022-11-25 NOTE — ED PROVIDER NOTE - OBJECTIVE STATEMENT
87-year-old female with past medical history of HTN, HLD, hypothyroidism, Afibb, CKD presenting for evaluation of chest pain x2 days.  Symptoms are moderate.  No alleviating or aggravating factors.  Describes pain as pressure-like and associated with hypertension. No sob, fever, chills, abdominal pain, nausea, vomiting, diarrhea, back pain, urinary symptoms, headache, dizziness, paresthesias, or weakness.

## 2022-11-25 NOTE — H&P ADULT - NSHPREVIEWOFSYSTEMS_GEN_ALL_CORE
REVIEW OF SYSTEMS:    CONSTITUTIONAL: No weakness, Yes tremulous this morning   EYES/ENT: No visual changes;  No vertigo or throat pain   NECK: Posterior neck pain that occurs when she had high blood pressure and resolves when pressure lowers again.   RESPIRATORY: No cough, wheezing, hemoptysis; No shortness of breath  CARDIOVASCULAR: As per HPI   GASTROINTESTINAL: No abdominal or epigastric pain. No nausea, vomiting, or hematemesis; No diarrhea or constipation. No melena or hematochezia.  GENITOURINARY: No dysuria, frequency or hematuria  NEUROLOGICAL: No numbness or weakness  SKIN: No itching, rashes

## 2022-11-25 NOTE — H&P ADULT - ASSESSMENT
Ms Jolley is an 87 DANY w a PMH of CKD IIIb(makes urine), unsure of baseline Cr), HTN, dyslipidemia, hypothyroid, a recently diagnosed Afib w rvr (Aug 2022, on eliquis) and a small apical wall septal infarct (sept 26, seen by Meir) presents to the ED w 3 days of progressively worsening substernal chest pressure and home hypertensive readings of 170/99. Patient reports she was sitting on her couch on wednesday when she suddenly felt chest pressure which lasted several minutes. The pressure occured again when she was walking in the street. By Thursday the pressure was more present then not and today the CP was constant (though not present at time of interview). Patient reports she had COVID 2 weeks ago and since then her BP has been high and she has had "chest heaviness while walking. Patient reports that she has not missed a single day of medications in the past 3 months, has not had palpitations since August and has not had SOB for 1 week. Patient denies: nausea, diaphoresis,neck/jaw/arm pain, SOB (not since 1st week of COVID), changes in vision, feeling of impending doom. She has not traveled recently, denies leg cramping or swelling or pain of any kind at this time. Patient admitted to telemetry for atypical substernal CP.       ENALAPRIL 20 HELD FOR HyperKalemia     Problem List:    #HTN   #A-fib w RVR  #apical septal wall infarct in Sept  #COVID 2 weeks ago  likely secondary to recent COVID vs new onset decompensated HF vs increased salt intake (denied changes in diet, eating out more,eating pre-prepared foods) vs hyperthyroidism (no change in medication for >20 yrs) vs deconditioning (she went out less due to covid )   Can not r/o decompensated HF  less likely increased salt intake as patient is "very careful and picky" as per family and patient  less likely hyperthyroidism as she denies heat/cold intolerance, constipation, flushing of skin, palpitations  -f/u Dr Ghosh consult  -f/u TTE for wall motion abnormalities   -trop - x1  -f/u BNP (for HF)  -f/u trop (doubt will increase as has been going on several days)  -lasix 40 IV x1 for peripheral edema (ENALAPRIL HELD)   -c/w home amlodipine (recently increased to 10 BID as per patient and daughter-in-law), metoprolol 12.5 BID, apixiban 2.5 bid    #LUDY  #CKD IIIb  followed by Dr Reeder ([phone]) in [place]. does not recall baseline Cr  relative to Cr in Sept (1.8) patient's 2.1 represents an LUDY  patient continues to make urine  -f/u CMP, Phos    #Hypothyroid  continue w synthroid  -f/u TSH/T4    Misc:  DVT proph: apixiban  GI proph: NA  Diet: DASH and renal restrict  Activity: full, as tolerated (ambulates w/o assistance)  Dispo: admit to tele

## 2022-11-25 NOTE — ED ADULT TRIAGE NOTE - CHIEF COMPLAINT QUOTE
Colon and Rectal [[facility]]    COLON RESECTION LOW ANTERIOR  Procedure Note    Denise Briggs  1/20/2020    Pre-op Diagnosis:   Lifelong constipation  Anterior mucosal rectal prolapse  Decreased sphincter tone    Post-op Diagnosis:     Same  Umbilical hernia extensive pelvic adhesions      Procedure(s):  EXTEND LEFT HEMICOLECTOMY WITH RECTOPEXY, takedown splenic flexure, repair umbilical hernia, extensive pelvic enterolysis and proctoscopy    Surgeon(s):  Luke Vail MD    Anesthesia: General with Block    Staff:   Circulator: Angeles Arias RN; Viola Romeo RN  Scrub Person: Lola Howe Brandie  Nursing Assistant: Ewa Medina  Assistant: Minnie De La Torre PA-C    Estimated Blood Loss: 45 mL    Specimens:                Order Name Source Comment Collection Info Order Time   TISSUE PATHOLOGY EXAM Large Intestine, Sigmoid Colon  Collected By: Luke Vail MD 1/20/2020 11:57 AM         Drains: Rodriguez catheter    Findings: 1 cm umbilical hernia taking care of on entry.  Remote YECENIA/BSO and appendectomy which left extensive scarring in the pelvis and colon omentum was densely adherent to the anterior abdominal wall.  The strange scar was someone must have mobilized the presacral space since there was dense scarring going from the sacral promontory deep into the pelvis.  The left ureter was also more medial which is often a finding when this is been done.  Then scarring was present with the sigmoid stuck to the lower pelvis.  The ileum was stuck deep as well.    The transverse colon was quite large as noted on preop colonoscopy and x-ray.  Liver gallbladder spleen small bowel were otherwise all normal.    Technique: The patient was taken to the operating room placed under general anesthesia on a beanbag with a Rodriguez catheter in [facility] and a tap block.  The abdomen was prepped and draped appropriately.  After timeout a lower vertical incision was made.  She had a large  Pfannenstiel incision that we went through.  Enteral lysis ensued.    Left colon was mobilized along the line of Toldt and the splenic flexure taken down.  Left ureter swept out of harm's way.  A tedious dissection ensued getting into the presacral space to mobilize the rectum.  The same was to the cul-de-sac scarring.  Eventually a sizing instrument was able to be passed up to the rectosigmoid junction which was eventually divided with a linear 55 green stapler.  A point was chosen on the upper left colon rather than the transverse colon for the anastomosis because of the size discrepancy.  Left colon is much smaller and therefore I felt safer attaching it to the rectum.  The EEA 29 stapler was brought out the anterior rectum and the left colon snapped into place and the anastomosis carried out uneventfully.  The weak anal sphincter was noted once again.  That was another reason I chose to use the left colon rather than the transverse colon because he did not want her to have diarrhea.  A lot of the pelvic scar easily could have been responsible for constipation.  The reflected AVILA staple line was then sewn to the sacral promontory with 2-0 Prolene.  Proctoscopy showed an airtight anastomosis with good blood supply about 15 cm.    The abdomen was irrigated with saline once again and GI contents placed back anatomically.  The fascia was closed in single layer with #1 PDS taking care to close the umbilical hernia simultaneously.  Subcutaneous tissue was irrigated with saline and skin closed with staples.  A Telfa dressing was applied.  The patient tolerated the procedure well was taken to the recovery room in satisfactory condition.    Complications: 0      Luke Vail MD     Date: 1/20/2020  Time: 12:21 PM   chest pain

## 2022-11-25 NOTE — H&P ADULT - ATTENDING COMMENTS
88 YO F w/ a PMH of CKD3b, HTN, DLD, hypothyroid, chronic Afib (Apixaban), and a small apical wall septal infarct who presents to the hospital w/ a c/o CP for the past x 3 days. Described as pressure, substernal, non-radiating, and constant. + worse with exertion. Associated with - SOB, - nausea, - palpitations, and - diaphoresis. Denies any fevers/chills, cough, ABD pain, LE swelling, dysuria, headaches, or rashes.     In the ED, cardiac enzymes were negative and an EKG showed no ischemic changes. IV Lasix given in the ED.     Family hx of early heart disease (-)     Physical exam shows pt in NAD, resting comfortably. VSS, afebrile, not hypoxic on RA. A&Ox3. Neuro exam intact. CTA B/L with no W/C/R. RRR, no M/G/R. ABD is soft and non-tender to palpation, normoactive BSs. LEs without swelling, pulses palpated bilaterally. No rashes. Labs and imaging as above resident note.     Chest pain, atypical, rule out ACS. Admit to tele. Cardio consult. Serial cardiac enzymes and EKGs. A1c, Lipids, and TSH. Echo. PRN pain meds. Restart home meds.    LUDY on CKD3b vs progression of CKD3b. Send UA, urine lytes, urine pr:cr ratio, urine urea & Cr, and trend BMP. Monitor urinary out-put. Hold nephrotoxic agents.     Normocytic anemia, at baseline. Pt denies bleeding symptoms. Replace as necessary.     Hyperkalemia. Treat now. Repeat BMP in the AM.     Hypertensive urgency. Restart home meds. Monitor VSs.     Hx of DLD, hypothyroid, chronic Afib (Apixaban), and a small apical wall septal infarct. Restart home meds, except as stated above. DVT PPX. Inform PCP of pt's admission to hospital. My note supersedes the residents note.     Date seen by Attendin22

## 2022-11-26 LAB
ALBUMIN SERPL ELPH-MCNC: 4 G/DL — SIGNIFICANT CHANGE UP (ref 3.5–5.2)
ALP SERPL-CCNC: 52 U/L — SIGNIFICANT CHANGE UP (ref 30–115)
ALT FLD-CCNC: 13 U/L — SIGNIFICANT CHANGE UP (ref 0–41)
ANION GAP SERPL CALC-SCNC: 10 MMOL/L — SIGNIFICANT CHANGE UP (ref 7–14)
AST SERPL-CCNC: 19 U/L — SIGNIFICANT CHANGE UP (ref 0–41)
BILIRUB SERPL-MCNC: 0.4 MG/DL — SIGNIFICANT CHANGE UP (ref 0.2–1.2)
BUN SERPL-MCNC: 46 MG/DL — HIGH (ref 10–20)
CALCIUM SERPL-MCNC: 9.1 MG/DL — SIGNIFICANT CHANGE UP (ref 8.4–10.5)
CHLORIDE SERPL-SCNC: 104 MMOL/L — SIGNIFICANT CHANGE UP (ref 98–110)
CO2 SERPL-SCNC: 26 MMOL/L — SIGNIFICANT CHANGE UP (ref 17–32)
CREAT SERPL-MCNC: 2 MG/DL — HIGH (ref 0.7–1.5)
EGFR: 24 ML/MIN/1.73M2 — LOW
GLUCOSE SERPL-MCNC: 94 MG/DL — SIGNIFICANT CHANGE UP (ref 70–99)
HCT VFR BLD CALC: 34.2 % — LOW (ref 37–47)
HGB BLD-MCNC: 11.4 G/DL — LOW (ref 12–16)
MAGNESIUM SERPL-MCNC: 2.2 MG/DL — SIGNIFICANT CHANGE UP (ref 1.8–2.4)
MCHC RBC-ENTMCNC: 29.4 PG — SIGNIFICANT CHANGE UP (ref 27–31)
MCHC RBC-ENTMCNC: 33.3 G/DL — SIGNIFICANT CHANGE UP (ref 32–37)
MCV RBC AUTO: 88.1 FL — SIGNIFICANT CHANGE UP (ref 81–99)
NRBC # BLD: 0 /100 WBCS — SIGNIFICANT CHANGE UP (ref 0–0)
PHOSPHATE SERPL-MCNC: 4.1 MG/DL — SIGNIFICANT CHANGE UP (ref 2.1–4.9)
PLATELET # BLD AUTO: 196 K/UL — SIGNIFICANT CHANGE UP (ref 130–400)
POTASSIUM SERPL-MCNC: 4.2 MMOL/L — SIGNIFICANT CHANGE UP (ref 3.5–5)
POTASSIUM SERPL-SCNC: 4.2 MMOL/L — SIGNIFICANT CHANGE UP (ref 3.5–5)
PROT SERPL-MCNC: 6.1 G/DL — SIGNIFICANT CHANGE UP (ref 6–8)
RBC # BLD: 3.88 M/UL — LOW (ref 4.2–5.4)
RBC # FLD: 13.3 % — SIGNIFICANT CHANGE UP (ref 11.5–14.5)
SODIUM SERPL-SCNC: 140 MMOL/L — SIGNIFICANT CHANGE UP (ref 135–146)
T4 AB SER-ACNC: 8.3 UG/DL — SIGNIFICANT CHANGE UP (ref 4.6–12)
TROPONIN T SERPL-MCNC: <0.01 NG/ML — SIGNIFICANT CHANGE UP
TSH SERPL-MCNC: 4.93 UIU/ML — HIGH (ref 0.27–4.2)
WBC # BLD: 5.34 K/UL — SIGNIFICANT CHANGE UP (ref 4.8–10.8)
WBC # FLD AUTO: 5.34 K/UL — SIGNIFICANT CHANGE UP (ref 4.8–10.8)

## 2022-11-26 PROCEDURE — 99233 SBSQ HOSP IP/OBS HIGH 50: CPT

## 2022-11-26 RX ORDER — LANOLIN ALCOHOL/MO/W.PET/CERES
3 CREAM (GRAM) TOPICAL AT BEDTIME
Refills: 0 | Status: DISCONTINUED | OUTPATIENT
Start: 2022-11-26 | End: 2022-11-28

## 2022-11-26 RX ORDER — SODIUM CHLORIDE 9 MG/ML
1000 INJECTION INTRAMUSCULAR; INTRAVENOUS; SUBCUTANEOUS
Refills: 0 | Status: DISCONTINUED | OUTPATIENT
Start: 2022-11-26 | End: 2022-11-26

## 2022-11-26 RX ADMIN — APIXABAN 2.5 MILLIGRAM(S): 2.5 TABLET, FILM COATED ORAL at 17:31

## 2022-11-26 RX ADMIN — ISOSORBIDE MONONITRATE 30 MILLIGRAM(S): 60 TABLET, EXTENDED RELEASE ORAL at 12:03

## 2022-11-26 RX ADMIN — Medication 12.5 MILLIGRAM(S): at 17:31

## 2022-11-26 RX ADMIN — APIXABAN 2.5 MILLIGRAM(S): 2.5 TABLET, FILM COATED ORAL at 05:44

## 2022-11-26 RX ADMIN — Medication 50 MICROGRAM(S): at 05:44

## 2022-11-26 RX ADMIN — AMLODIPINE BESYLATE 10 MILLIGRAM(S): 2.5 TABLET ORAL at 05:44

## 2022-11-26 RX ADMIN — AMLODIPINE BESYLATE 10 MILLIGRAM(S): 2.5 TABLET ORAL at 22:19

## 2022-11-26 RX ADMIN — Medication 81 MILLIGRAM(S): at 12:04

## 2022-11-26 RX ADMIN — Medication 12.5 MILLIGRAM(S): at 05:44

## 2022-11-26 RX ADMIN — SENNA PLUS 2 TABLET(S): 8.6 TABLET ORAL at 22:19

## 2022-11-26 NOTE — PROGRESS NOTE ADULT - TIME BILLING
87F PMHx CKD III, hypothyroidism, HTN, AFib on eliquis, HLD here with chest pain. Covid.    #Chest pain, described as midsternal  nonradiating, lasting seconds  now resolved; in setting of covid  ce neg  ekg noted  tte  cards eval  #Covid  cxr bl opacities  no hypoxia, satting well on ra  monitor off steroids  #CKD III  scr near baseline  trend  #Hypothyroidism  synthroid 50  #AFib, chronic  lopressor 12.5 bid  eliquis  #HTN  norvasc 10  imdur 30  #HLD  asa  lipitor 20  #DVT ppx  eliquis    #Progress Note Handoff:  Pending (specify):  Consults_________, Tests________, Test Results_______, Other___tte, f/u cards______  Family discussion: d/w pt at bedside re: treatment plan, primary dx  Disposition: Home___/SNF___/Other________/Unknown at this time_____x___ 87F PMHx CKD III, hypothyroidism, HTN, AFib on eliquis, HLD here with chest pain. Covid.    #Chest pain, described as midsternal  nonradiating, lasting seconds  now resolved; in setting of covid  ce neg  ekg noted  tte  cards eval  #Covid  cxr bl opacities  no hypoxia, satting well on ra  monitor off steroids  #CKD III  scr near baseline  trend  #Hypothyroidism  synthroid 50  #AFib, chronic  lopressor 12.5 bid  eliquis  #HTN  norvasc 10  imdur 30  #HLD  asa  lipitor 20  #DVT ppx  eliquis    Please involve PMD Dr. George Penny in decision making process; call me for his cellphone number= [phone]    #Progress Note Handoff:  Pending (specify):  Consults_________, Tests________, Test Results_______, Other___tte, f/u cards______  Family discussion: d/w pt at bedside re: treatment plan, primary dx  Disposition: Home___/SNF___/Other________/Unknown at this time_____x___

## 2022-11-27 LAB
ALBUMIN SERPL ELPH-MCNC: 4 G/DL — SIGNIFICANT CHANGE UP (ref 3.5–5.2)
ALP SERPL-CCNC: 51 U/L — SIGNIFICANT CHANGE UP (ref 30–115)
ALT FLD-CCNC: 13 U/L — SIGNIFICANT CHANGE UP (ref 0–41)
ANION GAP SERPL CALC-SCNC: 11 MMOL/L — SIGNIFICANT CHANGE UP (ref 7–14)
AST SERPL-CCNC: 20 U/L — SIGNIFICANT CHANGE UP (ref 0–41)
BILIRUB SERPL-MCNC: 0.3 MG/DL — SIGNIFICANT CHANGE UP (ref 0.2–1.2)
BUN SERPL-MCNC: 42 MG/DL — HIGH (ref 10–20)
CALCIUM SERPL-MCNC: 9.1 MG/DL — SIGNIFICANT CHANGE UP (ref 8.4–10.5)
CHLORIDE SERPL-SCNC: 102 MMOL/L — SIGNIFICANT CHANGE UP (ref 98–110)
CO2 SERPL-SCNC: 25 MMOL/L — SIGNIFICANT CHANGE UP (ref 17–32)
CREAT SERPL-MCNC: 2.1 MG/DL — HIGH (ref 0.7–1.5)
EGFR: 23 ML/MIN/1.73M2 — LOW
GLUCOSE SERPL-MCNC: 103 MG/DL — HIGH (ref 70–99)
HCT VFR BLD CALC: 35.3 % — LOW (ref 37–47)
HGB BLD-MCNC: 11.7 G/DL — LOW (ref 12–16)
MAGNESIUM SERPL-MCNC: 2.2 MG/DL — SIGNIFICANT CHANGE UP (ref 1.8–2.4)
MCHC RBC-ENTMCNC: 29.3 PG — SIGNIFICANT CHANGE UP (ref 27–31)
MCHC RBC-ENTMCNC: 33.1 G/DL — SIGNIFICANT CHANGE UP (ref 32–37)
MCV RBC AUTO: 88.3 FL — SIGNIFICANT CHANGE UP (ref 81–99)
NRBC # BLD: 0 /100 WBCS — SIGNIFICANT CHANGE UP (ref 0–0)
PLATELET # BLD AUTO: 217 K/UL — SIGNIFICANT CHANGE UP (ref 130–400)
POTASSIUM SERPL-MCNC: 3.8 MMOL/L — SIGNIFICANT CHANGE UP (ref 3.5–5)
POTASSIUM SERPL-SCNC: 3.8 MMOL/L — SIGNIFICANT CHANGE UP (ref 3.5–5)
PROT SERPL-MCNC: 6.3 G/DL — SIGNIFICANT CHANGE UP (ref 6–8)
RBC # BLD: 4 M/UL — LOW (ref 4.2–5.4)
RBC # FLD: 13.2 % — SIGNIFICANT CHANGE UP (ref 11.5–14.5)
SODIUM SERPL-SCNC: 138 MMOL/L — SIGNIFICANT CHANGE UP (ref 135–146)
WBC # BLD: 6.09 K/UL — SIGNIFICANT CHANGE UP (ref 4.8–10.8)
WBC # FLD AUTO: 6.09 K/UL — SIGNIFICANT CHANGE UP (ref 4.8–10.8)

## 2022-11-27 PROCEDURE — 99233 SBSQ HOSP IP/OBS HIGH 50: CPT

## 2022-11-27 RX ORDER — NIFEDIPINE 30 MG
30 TABLET, EXTENDED RELEASE 24 HR ORAL ONCE
Refills: 0 | Status: DISCONTINUED | OUTPATIENT
Start: 2022-11-27 | End: 2022-11-27

## 2022-11-27 RX ORDER — LABETALOL HCL 100 MG
10 TABLET ORAL ONCE
Refills: 0 | Status: COMPLETED | OUTPATIENT
Start: 2022-11-27 | End: 2022-11-27

## 2022-11-27 RX ADMIN — Medication 81 MILLIGRAM(S): at 12:55

## 2022-11-27 RX ADMIN — ISOSORBIDE MONONITRATE 30 MILLIGRAM(S): 60 TABLET, EXTENDED RELEASE ORAL at 12:55

## 2022-11-27 RX ADMIN — APIXABAN 2.5 MILLIGRAM(S): 2.5 TABLET, FILM COATED ORAL at 05:41

## 2022-11-27 RX ADMIN — APIXABAN 2.5 MILLIGRAM(S): 2.5 TABLET, FILM COATED ORAL at 17:22

## 2022-11-27 RX ADMIN — Medication 3 MILLIGRAM(S): at 00:16

## 2022-11-27 RX ADMIN — Medication 50 MICROGRAM(S): at 05:41

## 2022-11-27 RX ADMIN — Medication 10 MILLIGRAM(S): at 01:09

## 2022-11-27 RX ADMIN — AMLODIPINE BESYLATE 10 MILLIGRAM(S): 2.5 TABLET ORAL at 22:03

## 2022-11-27 RX ADMIN — Medication 3 MILLIGRAM(S): at 22:04

## 2022-11-27 RX ADMIN — Medication 12.5 MILLIGRAM(S): at 17:22

## 2022-11-27 RX ADMIN — Medication 12.5 MILLIGRAM(S): at 05:41

## 2022-11-27 RX ADMIN — ATORVASTATIN CALCIUM 20 MILLIGRAM(S): 80 TABLET, FILM COATED ORAL at 22:03

## 2022-11-27 RX ADMIN — SENNA PLUS 2 TABLET(S): 8.6 TABLET ORAL at 22:04

## 2022-11-27 NOTE — CONSULT NOTE ADULT - ASSESSMENT
Patient presents with chest pressure     Kaley    negative   previous   abn stress test     LUDY       history AFIB   on eliquis now SR      cont medical therapy  possible   cardiac cath      renal eval

## 2022-11-27 NOTE — PATIENT PROFILE ADULT - FALL HARM RISK - HARM RISK INTERVENTIONS

## 2022-11-27 NOTE — PROGRESS NOTE ADULT - ASSESSMENT
87F PMHx CKD III, hypothyroidism, HTN, AFib on eliquis, HLD here with chest pain. Covid.    A/P:   Chest pain: resolved, unspecified.   EKG showed Normal Sinus Rhythm, no acute ST or T changes.   Serial Troponin negative.   Pending Echo, Pro-BNP 1200 s/p Lasix in the ED.   Cardiology follow up, possible cardiac cath.     COVID-19 infection:   Patient was diagnosed on 11/13 outpatient,   Currently no fever, cough or hypoxia.   CXR showed minimal interstitial infiltrates.   No need for treatment, can discontinue isolation.     Paroxysmal Atrial Fibrillation:   HR is controlled, sinus rhythm  Continue Lopressor and Eliquis.     CKD III  Cr increased to 2.1 from 1.8, s/p Lasix     Hypothyroidism: Continue synthroid 50  HTN: Continue Amlodipine 10mg, Imdur and Lopressor.   Hyperlipidemia: continue Lipitor.     Please involve PMD Dr. George Penny in decision making process;     #Progress Note Handoff:  Pending (specify):  cardiology follow up for possible cardiac cath  Family discussion: d/w pt at bedside re: treatment plan, primary dx  Disposition: Home.

## 2022-11-27 NOTE — CONSULT NOTE ADULT - SUBJECTIVE AND OBJECTIVE BOX
Patient is a 87y old  Female who presents with a chief complaint of worsening Chest pressure for 3 days, HTN on home reading (26 Nov 2022 11:15)      HPI:  Ms Jolley is an 87 DANY w a PMH of CKD IIIb(makes urine), unsure of baseline Cr), HTN, dyslipidemia, hypothyroid, a recently diagnosed Afib w rvr (Aug 2022, on eliquis) and a small apical wall septal infarct (sept 26, seen by Meir) presents to the ED w 3 days of progressively worsening substernal chest pressure and home hypertensive readings of 170/99. Patient reports she was sitting on her couch on wednesday when she suddenly felt chest pressure which lasted several minutes. The pressure occured again when she was walking in the street. By Thursday the pressure was more present then not and today the CP was constant (though not present at time of interview). Patient reports she had COVID 2 weeks ago and since then her BP has been high and she has had "chest heaviness while walking. Patient reports that she has not missed a single day of medications in the past 3 months, has not had palpitations since August and has not had SOB for 1 week. Patient denies: nausea, diaphoresis,neck/jaw/arm pain, SOB (not since 1st week of COVID), changes in vision, feeling of impending doom. She has not traveled recently, denies leg cramping or swelling or pain of any kind at this time.      ENALAPRIL 20 HELD FOR HyperKalemia  (25 Nov 2022 18:15)      PAST MEDICAL & SURGICAL HISTORY:  Hypertension      Hypothyroidism      Dyslipidemia      Chronic kidney disease, unspecified CKD stage      Atrial fibrillation      History of total knee replacement, right          PREVIOUS DIAGNOSTIC TESTING:      ECHO  FINDINGS:    STRESS  FINDINGS:    CATHETERIZATION  FINDINGS:    MEDICATIONS  (STANDING):  amLODIPine   Tablet 10 milliGRAM(s) Oral at bedtime  apixaban 2.5 milliGRAM(s) Oral two times a day  aspirin enteric coated 81 milliGRAM(s) Oral daily  atorvastatin 20 milliGRAM(s) Oral at bedtime  isosorbide   mononitrate ER Tablet (IMDUR) 30 milliGRAM(s) Oral daily  levothyroxine 50 MICROGram(s) Oral daily  metoprolol tartrate 12.5 milliGRAM(s) Oral two times a day  senna 2 Tablet(s) Oral at bedtime    MEDICATIONS  (PRN):  melatonin 3 milliGRAM(s) Oral at bedtime PRN Insomnia  zolpidem 5 milliGRAM(s) Oral at bedtime PRN Insomnia      FAMILY HISTORY:  FH: prostate cancer (Father)        SOCIAL HISTORY:  CIGARETTES:    ALCOHOL:    REVIEW OF SYSTEMS:  CONSTITUTIONAL: No fever, weight loss, or fatigue  NECK: No pain or stiffness  RESPIRATORY: No cough, wheezing, chills or hemoptysis; No shortness of breath  CARDIOVASCULAR: No chest pain, palpitations, dizziness, or leg swelling  GASTROINTESTINAL: No abdominal or epigastric pain. No nausea, vomiting, or hematemesis; No diarrhea or constipation. No melena or hematochezia.  GENITOURINARY: No dysuria, frequency, hematuria, or incontinence  NEUROLOGICAL: No headaches, memory loss, loss of strength, numbness, or tremors  SKIN: No itching, burning, rashes, or lesions   ENDOCRINE: No heat or cold intolerance; No hair loss  MUSCULOSKELETAL: No joint pain or swelling; No muscle, back, or extremity pain  HEME/LYMPH: No easy bruising, or bleeding gums          Vital Signs Last 24 Hrs  T(C): 36.4 (27 Nov 2022 04:45), Max: 36.7 (26 Nov 2022 16:48)  T(F): 97.6 (27 Nov 2022 04:45), Max: 98 (26 Nov 2022 16:48)  HR: 78 (27 Nov 2022 04:45) (71 - 120)  BP: 136/60 (27 Nov 2022 04:45) (135/59 - 190/93)  BP(mean): --  RR: 18 (27 Nov 2022 04:45) (18 - 18)  SpO2: 97% (26 Nov 2022 21:09) (95% - 97%)    Parameters below as of 26 Nov 2022 21:09  Patient On (Oxygen Delivery Method): room air            PHYSICAL EXAM:  GENERAL: NAD, well-groomed, well-developed  HEAD:  Atraumatic, Normocephalic  NECK: Supple, No JVD, Normal thyroid  NERVOUS SYSTEM:  Alert & Oriented X3, Good concentration  CHEST/LUNG: Clear to percussion bilaterally; No rales, rhonchi, wheezing, or rubs  HEART: Regular rate and rhythm; No murmurs, rubs, or gallops  ABDOMEN: Soft, Nontender, Nondistended; Bowel sounds present  EXTREMITIES:  2+ Peripheral Pulses, No clubbing, cyanosis, or edema  SKIN: No rashes or lesions    INTERPRETATION OF TELEMETRY:    ECG:    I&O's Detail    26 Nov 2022 07:01  -  27 Nov 2022 07:00  --------------------------------------------------------  IN:    Oral Fluid: 360 mL  Total IN: 360 mL    OUT:  Total OUT: 0 mL    Total NET: 360 mL          LABS:                        11.7   6.09  )-----------( 217      ( 27 Nov 2022 06:27 )             35.3     11-27    138  |  102  |  42<H>  ----------------------------<  103<H>  3.8   |  25  |  2.1<H>    Ca    9.1      27 Nov 2022 06:27  Phos  4.1     11-26  Mg     2.2     11-27    TPro  6.3  /  Alb  4.0  /  TBili  0.3  /  DBili  x   /  AST  20  /  ALT  13  /  AlkPhos  51  11-27    CARDIAC MARKERS ( 26 Nov 2022 06:32 )  x     / <0.01 ng/mL / x     / x     / x      CARDIAC MARKERS ( 25 Nov 2022 15:28 )  x     / <0.01 ng/mL / x     / x     / x          PT/INR - ( 25 Nov 2022 15:28 )   PT: 12.30 sec;   INR: 1.08 ratio         PTT - ( 25 Nov 2022 15:28 )  PTT:30.4 sec    I&O's Summary    26 Nov 2022 07:01  -  27 Nov 2022 07:00  --------------------------------------------------------  IN: 360 mL / OUT: 0 mL / NET: 360 mL        RADIOLOGY & ADDITIONAL STUDIES:

## 2022-11-28 VITALS
RESPIRATION RATE: 18 BRPM | TEMPERATURE: 96 F | SYSTOLIC BLOOD PRESSURE: 158 MMHG | DIASTOLIC BLOOD PRESSURE: 80 MMHG | HEART RATE: 84 BPM

## 2022-11-28 LAB
ALBUMIN SERPL ELPH-MCNC: 3.7 G/DL — SIGNIFICANT CHANGE UP (ref 3.5–5.2)
ALP SERPL-CCNC: 46 U/L — SIGNIFICANT CHANGE UP (ref 30–115)
ALT FLD-CCNC: 12 U/L — SIGNIFICANT CHANGE UP (ref 0–41)
ANION GAP SERPL CALC-SCNC: 9 MMOL/L — SIGNIFICANT CHANGE UP (ref 7–14)
AST SERPL-CCNC: 18 U/L — SIGNIFICANT CHANGE UP (ref 0–41)
BILIRUB SERPL-MCNC: 0.3 MG/DL — SIGNIFICANT CHANGE UP (ref 0.2–1.2)
BUN SERPL-MCNC: 42 MG/DL — HIGH (ref 10–20)
CALCIUM SERPL-MCNC: 8.5 MG/DL — SIGNIFICANT CHANGE UP (ref 8.4–10.5)
CHLORIDE SERPL-SCNC: 108 MMOL/L — SIGNIFICANT CHANGE UP (ref 98–110)
CO2 SERPL-SCNC: 24 MMOL/L — SIGNIFICANT CHANGE UP (ref 17–32)
CREAT SERPL-MCNC: 1.9 MG/DL — HIGH (ref 0.7–1.5)
EGFR: 25 ML/MIN/1.73M2 — LOW
GLUCOSE SERPL-MCNC: 91 MG/DL — SIGNIFICANT CHANGE UP (ref 70–99)
HCT VFR BLD CALC: 30.4 % — LOW (ref 37–47)
HGB BLD-MCNC: 10.2 G/DL — LOW (ref 12–16)
MAGNESIUM SERPL-MCNC: 2.1 MG/DL — SIGNIFICANT CHANGE UP (ref 1.8–2.4)
MCHC RBC-ENTMCNC: 29.6 PG — SIGNIFICANT CHANGE UP (ref 27–31)
MCHC RBC-ENTMCNC: 33.6 G/DL — SIGNIFICANT CHANGE UP (ref 32–37)
MCV RBC AUTO: 88.1 FL — SIGNIFICANT CHANGE UP (ref 81–99)
NRBC # BLD: 0 /100 WBCS — SIGNIFICANT CHANGE UP (ref 0–0)
PLATELET # BLD AUTO: 173 K/UL — SIGNIFICANT CHANGE UP (ref 130–400)
POTASSIUM SERPL-MCNC: 3.8 MMOL/L — SIGNIFICANT CHANGE UP (ref 3.5–5)
POTASSIUM SERPL-SCNC: 3.8 MMOL/L — SIGNIFICANT CHANGE UP (ref 3.5–5)
PROT SERPL-MCNC: 5.6 G/DL — LOW (ref 6–8)
RBC # BLD: 3.45 M/UL — LOW (ref 4.2–5.4)
RBC # FLD: 13.3 % — SIGNIFICANT CHANGE UP (ref 11.5–14.5)
SODIUM SERPL-SCNC: 141 MMOL/L — SIGNIFICANT CHANGE UP (ref 135–146)
WBC # BLD: 4.76 K/UL — LOW (ref 4.8–10.8)
WBC # FLD AUTO: 4.76 K/UL — LOW (ref 4.8–10.8)

## 2022-11-28 PROCEDURE — 93306 TTE W/DOPPLER COMPLETE: CPT | Mod: 26

## 2022-11-28 PROCEDURE — 99238 HOSP IP/OBS DSCHRG MGMT 30/<: CPT

## 2022-11-28 RX ADMIN — APIXABAN 2.5 MILLIGRAM(S): 2.5 TABLET, FILM COATED ORAL at 05:48

## 2022-11-28 RX ADMIN — ISOSORBIDE MONONITRATE 30 MILLIGRAM(S): 60 TABLET, EXTENDED RELEASE ORAL at 11:09

## 2022-11-28 RX ADMIN — Medication 12.5 MILLIGRAM(S): at 05:49

## 2022-11-28 RX ADMIN — Medication 50 MICROGRAM(S): at 05:48

## 2022-11-28 RX ADMIN — Medication 81 MILLIGRAM(S): at 11:10

## 2022-11-28 NOTE — PROGRESS NOTE ADULT - ASSESSMENT
87F PMHx CKD III, hypothyroidism, HTN, AFib on eliquis, HLD here with chest pain. Covid.    A/P:   Chest pain: resolved, unspecified.   EKG showed Normal Sinus Rhythm, no acute ST or T changes.   Serial Troponin negative.   Pending Echo, Pro-BNP 1200 s/p Lasix in the ED.   Cardiology follow up, recommended echo. Discussed with Dr. Soler on the phone.     COVID-19 infection:   Patient was diagnosed on 11/13 outpatient,   Currently no fever, cough or hypoxia.   CXR showed minimal interstitial infiltrates.   No need for treatment, can discontinue isolation.     Paroxysmal Atrial Fibrillation:   HR is controlled, sinus rhythm  Continue Lopressor and Eliquis.     CKD III  Cr increased to 2.1 from 1.8, s/p Lasix     Hypothyroidism: Continue synthroid 50  HTN: Continue Amlodipine 10mg, Imdur and Lopressor.   Hyperlipidemia: continue Lipitor.     Please involve PMD Dr. George Penny in decision making process;     #Progress Note Handoff:  Pending (specify):  Echo, cardiology   Family discussion: d/w pt at bedside re: treatment plan, primary dx  Disposition: Home.

## 2022-11-28 NOTE — DISCHARGE NOTE PROVIDER - NSDCCPCAREPLAN_GEN_ALL_CORE_FT
PRINCIPAL DISCHARGE DIAGNOSIS  Diagnosis: Chest pain  Assessment and Plan of Treatment: # You came with symtoms of Chest pain. We did all the work up and trending your troponins and it was negative. Your EKG didnt show any ST changes consistent with acute coronary syndrome. Your BNP was mildly elevated which is a marker that shows heart functioning.   # Please continue to comply with your medications. Let me tell you that your Kidney functioning is worsening. Please maintain your BP as it can furthur increase the damage to kidneys.

## 2022-11-28 NOTE — DISCHARGE NOTE NURSING/CASE MANAGEMENT/SOCIAL WORK - NSDCPEFALRISK_GEN_ALL_CORE
For information on Fall & Injury Prevention, visit: [email or web address] OR  [email or web address] OR  https://www.cdc.gov/steadi/patient.html

## 2022-11-28 NOTE — DISCHARGE NOTE PROVIDER - REASON FOR ADMISSION
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   worsening Chest pressure for 3 days, HTN on home reading

## 2022-11-28 NOTE — PROGRESS NOTE ADULT - REASON FOR ADMISSION
worsening Chest pressure for 3 days, HTN on home reading

## 2022-11-28 NOTE — DISCHARGE NOTE PROVIDER - NSDCMRMEDTOKEN_GEN_ALL_CORE_FT
Ambien 5 mg oral tablet: 1 tab(s) orally once a day (at bedtime), As Needed  amLODIPine 5 mg oral tablet: 2 tab(s) orally 2 times a day  apixaban 2.5 mg oral tablet: 1 tab(s) orally 2 times a day  Aspirin Enteric Coated 81 mg oral delayed release tablet: 1 tab(s) orally once a day  calcium carbonate 600 mg oral tablet, chewable: 2 tab(s) orally once a day  enalapril 20 mg oral tablet: 1 tab(s) orally once a day  ergocalciferol 1.25 mg (50,000 intl units) oral capsule: 1 cap(s) orally once a week  fluticasone propionate 55 mcg/inh inhalation powder: 1 puff(s) inhaled every 12 hours, As Needed  isosorbide mononitrate 30 mg oral tablet, extended release: 1 tab(s) orally once a day (in the morning)  levothyroxine 50 mcg (0.05 mg) oral tablet: 1 tab(s) orally once a day  Melatonin 3 mg oral tablet: 1 tab(s) orally once a day (at bedtime), As Needed  Metoprolol Tartrate 25 mg oral tablet: 0.5 tab(s) orally 2 times a day  pantoprazole 40 mg oral delayed release tablet: 1 tab(s) orally once a day  rosuvastatin 5 mg oral tablet: 1 tab(s) orally once a day (at bedtime)   Theragran oral tablet: 1 tab(s) orally once a day

## 2022-11-28 NOTE — DISCHARGE NOTE PROVIDER - PROVIDER TOKENS
PROVIDER:[TOKEN:[[id number]],FOLLOWUP:[1 week]],PROVIDER:[TOKEN:[[id number]],FOLLOWUP:[1 week]]

## 2022-11-28 NOTE — DISCHARGE NOTE PROVIDER - HOSPITAL COURSE
Ms Jolley is an 87 DANY w a PMH of CKD IIIb(makes urine), unsure of baseline Cr), HTN, dyslipidemia, hypothyroid, a recently diagnosed Afib w rvr (Aug 2022, on eliquis) and a small apical wall septal infarct (sept 26, seen by Meir) presents to the ED w 3 days of progressively worsening substernal chest pressure and home hypertensive readings of 170/99. Patient reports she was sitting on her couch on wednesday when she suddenly felt chest pressure which lasted several minutes. The pressure occured again when she was walking in the street. By Thursday the pressure was more present then not and today the CP was constant (though not present at time of interview). Patient reports she had COVID 2 weeks ago and since then her BP has been high and she has had "chest heaviness while walking. Patient reports that she has not missed a single day of medications in the past 3 months.  Chest pain has resolved. Troponins were negative. EKG showed NSR. Although her BNP was slightly elevated. She also has ECHO as inpatient result still pending. She also came with COVID 19 positive but has been asymptomatic for now. Her case has been discussed with Dr Guzman he agreed if ECHO normal then discharge the patient.

## 2022-11-28 NOTE — DISCHARGE NOTE NURSING/CASE MANAGEMENT/SOCIAL WORK - NSDCVIVACCINE_GEN_ALL_CORE_FT
influenza, high-dose, quadrivalent; 30-Sep-2022 17:17; Louissaint, Nancy (RN); [employer]; [id number] (Exp. Date: 30-Jun-2023); IntraMuscular; Deltoid Left.; 0.7 milliLiter(s); VIS (VIS Published: 06-Aug-2021, VIS Presented: 30-Sep-2022);

## 2022-11-28 NOTE — PROGRESS NOTE ADULT - ASSESSMENT
Ms Jolley is an 87 DANY w a PMH of CKD IIIb(makes urine), unsure of baseline Cr), HTN, dyslipidemia, hypothyroid, a recently diagnosed Afib w rvr (Aug 2022, on eliquis) and a small apical wall septal infarct (sept 26, seen by Meir) presents to the ED w 3 days of progressively worsening substernal chest pressure and home hypertensive readings of 170/99. Patient reports she was sitting on her couch on wednesday when she suddenly felt chest pressure which lasted several minutes. The pressure occured again when she was walking in the street. By Thursday the pressure was more present then not and today the CP was constant (though not present at time of interview). Patient reports she had COVID 2 weeks ago and since then her BP has been high and she has had "chest heaviness while walking. Patient reports that she has not missed a single day of medications in the past 3 months, has not had palpitations since August and has not had SOB for 1 week    # HTN   # A-fib w RVR  # apical septal wall infarct in Sept  # COVID positive 2 weeks ago  - f/u Dr Ghosh consult  - f/u TTE scheduled to be today  - trop - x2 negative  - f/u BNP (for 1233)  - c/w home amlodipine (recently increased to 10 BID as per patient and daughter-in-law),   - metoprolol 12.5 BID,   - apixaban 2.5 bid    #LUDY  #CKD IIIb  - followed by Dr Reeder ([phone]) in [place]. does not recall baseline Cr  - relative to Cr in Sept (1.8) patient's 2.1 represents an LUDY  - BUN 42>>>50, Creat 1.9>>>2, GFR 25      #Hypothyroid  continue w synthroid  -f/u TSH 4.93     Misc:  DVT proph: apixiban  GI proph: NA  Diet: DASH and renal restrict  Activity: full, as tolerated (ambulates w/o assistance)

## 2022-11-28 NOTE — PROGRESS NOTE ADULT - SUBJECTIVE AND OBJECTIVE BOX
OLIVA EAST  87y  Female      Patient is a 87y old  Female who presents with a chief complaint of worsening Chest pressure for 3 days, HTN on home reading (27 Nov 2022 07:44)      INTERVAL HPI/OVERNIGHT EVENTS:  She feels ok, no chest pain.   Vital Signs Last 24 Hrs  T(C): 36.4 (27 Nov 2022 04:45), Max: 36.7 (26 Nov 2022 16:48)  T(F): 97.6 (27 Nov 2022 04:45), Max: 98 (26 Nov 2022 16:48)  HR: 78 (27 Nov 2022 04:45) (77 - 120)  BP: 136/60 (27 Nov 2022 04:45) (135/59 - 190/93)  BP(mean): --  RR: 20 (27 Nov 2022 09:21) (18 - 20)  SpO2: 100% (27 Nov 2022 09:21) (96% - 100%)    Parameters below as of 27 Nov 2022 09:21  Patient On (Oxygen Delivery Method): room air          11-26-22 @ 07:01  -  11-27-22 @ 07:00  --------------------------------------------------------  IN: 360 mL / OUT: 0 mL / NET: 360 mL    11-27-22 @ 07:01  -  11-27-22 @ 14:33  --------------------------------------------------------  IN: 384 mL / OUT: 0 mL / NET: 384 mL            Consultant(s) Notes Reviewed:  [x ] YES  [ ] NO          MEDICATIONS  (STANDING):  amLODIPine   Tablet 10 milliGRAM(s) Oral at bedtime  apixaban 2.5 milliGRAM(s) Oral two times a day  aspirin enteric coated 81 milliGRAM(s) Oral daily  atorvastatin 20 milliGRAM(s) Oral at bedtime  isosorbide   mononitrate ER Tablet (IMDUR) 30 milliGRAM(s) Oral daily  levothyroxine 50 MICROGram(s) Oral daily  metoprolol tartrate 12.5 milliGRAM(s) Oral two times a day  senna 2 Tablet(s) Oral at bedtime    MEDICATIONS  (PRN):  melatonin 3 milliGRAM(s) Oral at bedtime PRN Insomnia  zolpidem 5 milliGRAM(s) Oral at bedtime PRN Insomnia      LABS                          11.7   6.09  )-----------( 217      ( 27 Nov 2022 06:27 )             35.3     11-27    138  |  102  |  42<H>  ----------------------------<  103<H>  3.8   |  25  |  2.1<H>    Ca    9.1      27 Nov 2022 06:27  Phos  4.1     11-26  Mg     2.2     11-27    TPro  6.3  /  Alb  4.0  /  TBili  0.3  /  DBili  x   /  AST  20  /  ALT  13  /  AlkPhos  51  11-27        PT/INR - ( 25 Nov 2022 15:28 )   PT: 12.30 sec;   INR: 1.08 ratio         PTT - ( 25 Nov 2022 15:28 )  PTT:30.4 sec  Lactate Trend    CARDIAC MARKERS ( 26 Nov 2022 06:32 )  x     / <0.01 ng/mL / x     / x     / x      CARDIAC MARKERS ( 25 Nov 2022 15:28 )  x     / <0.01 ng/mL / x     / x     / x          CAPILLARY BLOOD GLUCOSE            RADIOLOGY & ADDITIONAL TESTS:    Imaging Personally Reviewed:  [ ] YES  [ ] NO    HEALTH ISSUES - PROBLEM Dx:          PHYSICAL EXAM:  GENERAL: NAD, well-developed.  HEAD:  Atraumatic, Normocephalic.  EYES: EOMI, PERRLA, conjunctiva and sclera clear.  NECK: Supple, No JVD.  CHEST/LUNG: Clear to auscultation bilaterally; No wheeze.  HEART: Regular rate and rhythm; S1 S2.   ABDOMEN: Soft, Nontender, Nondistended; Bowel sounds present.  EXTREMITIES:  2+ Peripheral Pulses, No clubbing, cyanosis, or edema.  PSYCH: AAOx3.  NEUROLOGY: non-focal.  SKIN: No rashes or lesions.
  OLIVA EAST  87y  Female      Patient is a 87y old  Female who presents with a chief complaint of worsening Chest pressure for 3 days, HTN on home reading (27 Nov 2022 14:23)      INTERVAL HPI/OVERNIGHT EVENTS:  She feels ok, no chest pain.   Vital Signs Last 24 Hrs  T(C): 36.3 (28 Nov 2022 04:40), Max: 36.8 (27 Nov 2022 20:50)  T(F): 97.4 (28 Nov 2022 04:40), Max: 98.2 (27 Nov 2022 20:50)  HR: 74 (28 Nov 2022 04:40) (71 - 91)  BP: 129/60 (28 Nov 2022 04:40) (117/60 - 139/67)  BP(mean): --  RR: 18 (28 Nov 2022 04:40) (18 - 18)  SpO2: 99% (27 Nov 2022 20:50) (99% - 99%)    Parameters below as of 27 Nov 2022 20:50  Patient On (Oxygen Delivery Method): room air          11-27-22 @ 07:01  -  11-28-22 @ 07:00  --------------------------------------------------------  IN: 804 mL / OUT: 0 mL / NET: 804 mL    11-28-22 @ 07:01  -  11-28-22 @ 13:26  --------------------------------------------------------  IN: 210 mL / OUT: 0 mL / NET: 210 mL            Consultant(s) Notes Reviewed:  [x ] YES  [ ] NO          MEDICATIONS  (STANDING):  amLODIPine   Tablet 10 milliGRAM(s) Oral at bedtime  apixaban 2.5 milliGRAM(s) Oral two times a day  aspirin enteric coated 81 milliGRAM(s) Oral daily  atorvastatin 20 milliGRAM(s) Oral at bedtime  isosorbide   mononitrate ER Tablet (IMDUR) 30 milliGRAM(s) Oral daily  levothyroxine 50 MICROGram(s) Oral daily  metoprolol tartrate 12.5 milliGRAM(s) Oral two times a day  senna 2 Tablet(s) Oral at bedtime    MEDICATIONS  (PRN):  melatonin 3 milliGRAM(s) Oral at bedtime PRN Insomnia  zolpidem 5 milliGRAM(s) Oral at bedtime PRN Insomnia      LABS                          10.2   4.76  )-----------( 173      ( 28 Nov 2022 05:56 )             30.4     11-28    141  |  108  |  42<H>  ----------------------------<  91  3.8   |  24  |  1.9<H>    Ca    8.5      28 Nov 2022 05:56  Mg     2.1     11-28    TPro  5.6<L>  /  Alb  3.7  /  TBili  0.3  /  DBili  x   /  AST  18  /  ALT  12  /  AlkPhos  46  11-28          Lactate Trend        CAPILLARY BLOOD GLUCOSE            RADIOLOGY & ADDITIONAL TESTS:    Imaging Personally Reviewed:  [ ] YES  [ ] NO    HEALTH ISSUES - PROBLEM Dx:          PHYSICAL EXAM:  GENERAL: NAD, well-developed.  HEAD:  Atraumatic, Normocephalic.  EYES: EOMI, PERRLA, conjunctiva and sclera clear.  NECK: Supple, No JVD.  CHEST/LUNG: Clear to auscultation bilaterally; No wheeze.  HEART: Regular rate and rhythm; S1 S2.   ABDOMEN: Soft, Nontender, Nondistended; Bowel sounds present.  EXTREMITIES:  2+ Peripheral Pulses, No clubbing, cyanosis, or edema.  PSYCH: AAOx3.  NEUROLOGY: non-focal.  SKIN: No rashes or lesions.
OLIVA EAST 87y Female  MRN#: [id number]   Hospital Day: 3d    SUBJECTIVE  Patient is a 87y old Female who presents with a chief complaint of worsening Chest pressure for 3 days, HTN on home reading (28 Nov 2022 13:25)  Currently admitted to medicine with the primary diagnosis of Chest pain      INTERVAL HPI AND OVERNIGHT EVENTS:  Patient was examined and seen at bedside. This morning she is resting comfortably in bed and reports no issues or overnight events.    OBJECTIVE  PAST MEDICAL & SURGICAL HISTORY  Hypertension    Hypothyroidism    Dyslipidemia    Chronic kidney disease, unspecified CKD stage    Atrial fibrillation    History of total knee replacement, right      ALLERGIES:  No Known Allergies    MEDICATIONS:  STANDING MEDICATIONS  amLODIPine   Tablet 10 milliGRAM(s) Oral at bedtime  apixaban 2.5 milliGRAM(s) Oral two times a day  aspirin enteric coated 81 milliGRAM(s) Oral daily  atorvastatin 20 milliGRAM(s) Oral at bedtime  isosorbide   mononitrate ER Tablet (IMDUR) 30 milliGRAM(s) Oral daily  levothyroxine 50 MICROGram(s) Oral daily  metoprolol tartrate 12.5 milliGRAM(s) Oral two times a day  senna 2 Tablet(s) Oral at bedtime    PRN MEDICATIONS  melatonin 3 milliGRAM(s) Oral at bedtime PRN  zolpidem 5 milliGRAM(s) Oral at bedtime PRN      VITAL SIGNS: Last 24 Hours  T(C): 35.8 (28 Nov 2022 13:44), Max: 36.8 (27 Nov 2022 20:50)  T(F): 96.5 (28 Nov 2022 13:44), Max: 98.2 (27 Nov 2022 20:50)  HR: 84 (28 Nov 2022 13:44) (71 - 91)  BP: 158/80 (28 Nov 2022 13:44) (117/60 - 158/80)  BP(mean): --  RR: 18 (28 Nov 2022 13:44) (18 - 18)  SpO2: 99% (27 Nov 2022 20:50) (99% - 99%)    LABS:                        10.2   4.76  )-----------( 173      ( 28 Nov 2022 05:56 )             30.4     11-28    141  |  108  |  42<H>  ----------------------------<  91  3.8   |  24  |  1.9<H>    Ca    8.5      28 Nov 2022 05:56  Mg     2.1     11-28    TPro  5.6<L>  /  Alb  3.7  /  TBili  0.3  /  DBili  x   /  AST  18  /  ALT  12  /  AlkPhos  46  11-28                  RADIOLOGY:      PHYSICAL EXAM:  CONSTITUTIONAL: No acute distress, AAOx3  HEAD: Atraumatic, normocephalic  EYES: EOM intact, PERRLA, conjunctiva and sclera clear  ENT: moist mucous membranes  PULMONARY: Clear to auscultation bilaterally  CARDIOVASCULAR: Regular rate and rhythm  GASTROINTESTINAL: Soft, non-tender, non-distended; bowel sounds present  MUSCULOSKELETAL: no edema  NEUROLOGY: non-focal  SKIN: warm and dry    
INTERVAL HPI/OVERNIGHT EVENTS:    SUBJECTIVE: Patient seen and examined at bedside.     cc: cp  no cp, sob, abd pain, fever  no cp, palpitations mills, orthopnea    OBJECTIVE:    VITAL SIGNS:  Vital Signs Last 24 Hrs  T(C): 35.7 (26 Nov 2022 07:59), Max: 36.9 (26 Nov 2022 05:30)  T(F): 96.3 (26 Nov 2022 07:59), Max: 98.4 (26 Nov 2022 05:30)  HR: 71 (26 Nov 2022 07:59) (67 - 72)  BP: 184/81 (26 Nov 2022 07:59) (141/67 - 184/81)  BP(mean): --  RR: 18 (26 Nov 2022 07:59) (18 - 18)  SpO2: 95% (26 Nov 2022 07:59) (95% - 98%)    Parameters below as of 26 Nov 2022 07:59  Patient On (Oxygen Delivery Method): room air          PHYSICAL EXAM:    General: NAD  HEENT: NC/AT; PERRL, clear conjunctiva  Neck: supple  Respiratory: CTA b/l  Cardiovascular: +S1/S2; RRR  Abdomen: soft, NT/ND; +BS x4  Extremities: WWP, 2+ peripheral pulses b/l; no LE edema  Skin: normal color and turgor; no rash  Neurological:    MEDICATIONS:  MEDICATIONS  (STANDING):  amLODIPine   Tablet 10 milliGRAM(s) Oral at bedtime  apixaban 2.5 milliGRAM(s) Oral two times a day  aspirin enteric coated 81 milliGRAM(s) Oral daily  atorvastatin 20 milliGRAM(s) Oral at bedtime  isosorbide   mononitrate ER Tablet (IMDUR) 30 milliGRAM(s) Oral daily  levothyroxine 50 MICROGram(s) Oral daily  metoprolol tartrate 12.5 milliGRAM(s) Oral two times a day  senna 2 Tablet(s) Oral at bedtime    MEDICATIONS  (PRN):  zolpidem 5 milliGRAM(s) Oral at bedtime PRN Insomnia      ALLERGIES:  Allergies    No Known Allergies    Intolerances        LABS:                        11.4   5.34  )-----------( 196      ( 26 Nov 2022 06:32 )             34.2     Hemoglobin: 11.4 g/dL (11-26 @ 06:32)  Hemoglobin: 11.3 g/dL (11-25 @ 15:28)    CBC Full  -  ( 26 Nov 2022 06:32 )  WBC Count : 5.34 K/uL  RBC Count : 3.88 M/uL  Hemoglobin : 11.4 g/dL  Hematocrit : 34.2 %  Platelet Count - Automated : 196 K/uL  Mean Cell Volume : 88.1 fL  Mean Cell Hemoglobin : 29.4 pg  Mean Cell Hemoglobin Concentration : 33.3 g/dL  Auto Neutrophil # : x  Auto Lymphocyte # : x  Auto Monocyte # : x  Auto Eosinophil # : x  Auto Basophil # : x  Auto Neutrophil % : x  Auto Lymphocyte % : x  Auto Monocyte % : x  Auto Eosinophil % : x  Auto Basophil % : x    11-26    140  |  104  |  46<H>  ----------------------------<  94  4.2   |  26  |  2.0<H>    Ca    9.1      26 Nov 2022 06:32  Phos  4.1     11-26  Mg     2.2     11-26    TPro  6.1  /  Alb  4.0  /  TBili  0.4  /  DBili  x   /  AST  19  /  ALT  13  /  AlkPhos  52  11-26    Creatinine Trend: 2.0<--, 2.1<--  LIVER FUNCTIONS - ( 26 Nov 2022 06:32 )  Alb: 4.0 g/dL / Pro: 6.1 g/dL / ALK PHOS: 52 U/L / ALT: 13 U/L / AST: 19 U/L / GGT: x           PT/INR - ( 25 Nov 2022 15:28 )   PT: 12.30 sec;   INR: 1.08 ratio         PTT - ( 25 Nov 2022 15:28 )  PTT:30.4 sec    hs Troponin:              CSF:                      EKG:   MICROBIOLOGY:    IMAGING:      Labs, imaging, EKG personally reviewed    RADIOLOGY & ADDITIONAL TESTS: Reviewed.

## 2022-11-28 NOTE — DISCHARGE NOTE PROVIDER - CARE PROVIDER_API CALL
Ronel Moser)  Internal Medicine  [address], [address]st [place] [address]  Phone: [phone]  Fax: [phone]  Follow Up Time: 1 week    Anibal Groves  CARDIOVASCULAR DISEASE  [address] [place] [address]  Phone: [phone]  Fax: [phone]  Follow Up Time: 1 week

## 2022-11-28 NOTE — DISCHARGE NOTE NURSING/CASE MANAGEMENT/SOCIAL WORK - PATIENT PORTAL LINK FT
You can access the FollowMyHealth Patient Portal offered by [facility] by registering at the following website: [email or web address]. By joining [employer]’s FollowMyHealth portal, you will also be able to view your health information using other applications (apps) compatible with our system.

## 2022-12-02 DIAGNOSIS — Z96.651 PRESENCE OF RIGHT ARTIFICIAL KNEE JOINT: ICD-10-CM

## 2022-12-02 DIAGNOSIS — I25.2 OLD MYOCARDIAL INFARCTION: ICD-10-CM

## 2022-12-02 DIAGNOSIS — N17.9 ACUTE KIDNEY FAILURE, UNSPECIFIED: ICD-10-CM

## 2022-12-02 DIAGNOSIS — E87.5 HYPERKALEMIA: ICD-10-CM

## 2022-12-02 DIAGNOSIS — I48.20 CHRONIC ATRIAL FIBRILLATION, UNSPECIFIED: ICD-10-CM

## 2022-12-02 DIAGNOSIS — N18.32 CHRONIC KIDNEY DISEASE, STAGE 3B: ICD-10-CM

## 2022-12-02 DIAGNOSIS — R07.9 CHEST PAIN, UNSPECIFIED: ICD-10-CM

## 2022-12-02 DIAGNOSIS — E03.9 HYPOTHYROIDISM, UNSPECIFIED: ICD-10-CM

## 2022-12-02 DIAGNOSIS — I48.0 PAROXYSMAL ATRIAL FIBRILLATION: ICD-10-CM

## 2022-12-02 DIAGNOSIS — I12.9 HYPERTENSIVE CHRONIC KIDNEY DISEASE WITH STAGE 1 THROUGH STAGE 4 CHRONIC KIDNEY DISEASE, OR UNSPECIFIED CHRONIC KIDNEY DISEASE: ICD-10-CM

## 2022-12-02 DIAGNOSIS — U07.1 COVID-19: ICD-10-CM

## 2022-12-02 DIAGNOSIS — Z79.01 LONG TERM (CURRENT) USE OF ANTICOAGULANTS: ICD-10-CM

## 2022-12-02 DIAGNOSIS — E78.5 HYPERLIPIDEMIA, UNSPECIFIED: ICD-10-CM

## 2023-01-05 NOTE — CDI QUERY NOTE - NSCDIOTHERTXTBX_GEN_ALL_CORE_HH
CLINICAL INDICATORS:    HP 11/25- 87 DANY w/PMH of CKD IIIb(makes urine), unsure of baseline Cr), HTN, dyslipidemia, hypothyroid, a recently diagnosed Afib w rvr (Aug 2022, on eliquis) and a small apical wall septal infarct (sept 26, seen by Meir) presents to the ED w 3 days of progressively worsening substernal chest pressure and home hypertensive readings of 170/99. Covid 2 weeks ago. +1 BL pitting edema 1/2 way up shins; Posterior neck pain that occurs when she had high BP and resolves when pressure lowers again; A/P: likely 2/2 recent COVID vs new onset decompensated HF vs increased salt intake; Cannot r/o decompensated HF    11/25 ProBNP 1233  11/25 CXR- Impression: Bilateral opacities    TTE echo 11/28- Summary:  1. LV Ejection Fraction by Bo's Method with a biplane EF of 68 %.  2. Normal global left ventricular systolic function.  3. Spectral Doppler shows impaired relaxation pattern of left   ventricular myocardial filling (Grade I diastolic dysfunction).  4. Mildly enlarged left atrium.  5. Trivial aortic regurgitation.    Home meds:  Enalapril 20 mg oral tablet: 1 tab(s) orally once a day  AmLODIPine 5 mg oral tablet: 1 tab(s) orally once a day  Imdur 30 mg oral tablet, extended release: 1 tab(s) orally once a day (in the  morning)  Metoprolol Tartrate 25 mg oral tablet: 0.5 tab(s) orally 2 times a day    MAR:  Furosemide 40mg IVP once, admin 11/25  Labetolol 10mg IVP once, admin 11/27  Amlodipine 10mg PO bedtime, admin 11/25-28, Norvasc 10mg PO once 11/26  Amlodipine 5mg PO daily 11/25  Imdur 30mg PO daily admin 11/25-28  Metoprolol Tartrate 12.5mg PO BID, admin 11/25-28    Based on the above clinical indicators, and your professional judgment, can the diagnosis of new onset decompensated HF be further clarified?    -patient had new onset decompensated diastolic heart failure  -patient had no heart failure  -Other, please specify:  -Clinically unable to determine    Thank you,  Lacey Mckee, RN, BSN, BBA, CCDS, CCS  [phone]
CLINICAL INDICATORS:    HP 11/25- 87 DANY w/PMH of CKD IIIb(makes urine), unsure of baseline Cr), HTN, dyslipidemia, hypothyroid, a recently diagnosed Afib w rvr (Aug 2022, on eliquis) and a small apical wall septal infarct (sept 26, seen by Meir) presents to the ED w 3 days of progressively worsening substernal chest pressure and home hypertensive readings of 170/99. Covid 2 weeks ago. +1 BL pitting edema 1/2 way up shins; Posterior neck pain that occurs when she had high BP and resolves when pressure lowers again; A/P: likely 2/2 recent COVID vs new onset decompensated HF vs increased salt intake; Cannot r/o decompensated HF    11/25 ProBNP 1233  11/25 CXR- Impression: Bilateral opacities    TTE echo 11/28- Summary:  1. LV Ejection Fraction by Bo's Method with a biplane EF of 68 %.  2. Normal global left ventricular systolic function.  3. Spectral Doppler shows impaired relaxation pattern of left   ventricular myocardial filling (Grade I diastolic dysfunction).  4. Mildly enlarged left atrium.  5. Trivial aortic regurgitation.    Home meds:  Enalapril 20 mg oral tablet: 1 tab(s) orally once a day  AmLODIPine 5 mg oral tablet: 1 tab(s) orally once a day  Imdur 30 mg oral tablet, extended release: 1 tab(s) orally once a day (in the  morning)  Metoprolol Tartrate 25 mg oral tablet: 0.5 tab(s) orally 2 times a day    MAR:  Furosemide 40mg IVP once, admin 11/25  Labetolol 10mg IVP once, admin 11/27  Amlodipine 10mg PO bedtime, admin 11/25-28, Norvasc 10mg PO once 11/26  Amlodipine 5mg PO daily 11/25  Imdur 30mg PO daily admin 11/25-28  Metoprolol Tartrate 12.5mg PO BID, admin 11/25-28    Based on the above clinical indicators, and your professional judgment, can the diagnosis of new onset decompensated HF be further clarified?    -patient had new onset decompensated diastolic heart failure  -patient had no heart failure  -Other, please specify:  -Clinically unable to determine    Thank you,  Lacey Mckee, RN, BSN, BBA, CCDS, CCS  [phone].
CLINICAL INDICATORS:  VS flow sheet:  11/25: 154/72  11/26: 166/75, 184/81, 157/81, 180/84, 169/81  11/27: 190/93, 135/99    HP 11/25- Reason for Admission: worsening Chest pressure for 3 days, HTN on home reading; Covid 2 weeks ago and since then her BP has been high and she has had “chest heaviness while walking” +1 BL pitting edema 1/2 way up shins; Posterior neck pain that occurs when she had high blood pressure and resolves when pressure lowers again. A/P: likely 2/2 recent COVID vs new onset decompensated HF vs increased salt intake (denied changes in diet, eating out more, eating pre-prepared foods) vs hyperthyroidism (no change in medication for >20 yrs) vs deconditioning (she went out less due to covid ) Cannot r/o decompensated HF;     IM 11/26-#Chest pain, described as midsternal, non-radiating, lasting seconds, now resolved; in setting of covid; #Covid- cxr bl opacities, no hypoxia, satting well on ra, monitor off steroids    Hosp 11/27-A/P: COVID-19 infection: diagnosed on 11/13 outpatient, Currently no fever, cough or hypoxia. CXR showed minimal interstitial infiltrates. No need for treatment, can discontinue isolation.     Card CS 11/27-presents with chest pressure. Kaley negative, previous abn stress test,  cont medical therapy, possible cardiac cath     DCS 11/28 – principal discharge diagnosis: chest pain    Orders: 11/25 Inpatient order. Isolation: none; 11/26 Bed request type telemetry. Isolation: none    Home meds:  Enalapril 20 mg oral tablet: 1 tab(s) orally once a day  AmLODIPine 5 mg oral tablet: 1 tab(s) orally once a day  Imdur 30 mg oral tablet, extended release: 1 tab(s) orally once a day (in the morning)  Metoprolol Tartrate 25 mg oral tablet: 0.5 tab(s) orally 2 times a day    MAR:  Furosemide 40mg IVP once, admin 11/25  Labetolol 10mg IVP once, admin 11/27  Amlodipine 10mg PO bedtime, admin 11/25-28, Norvasc 10mg PO once 11/26  Amlodipine 5mg PO daily 11/25  Imdur 30mg PO daily admin 11/25-28  Metoprolol Tartrate 12.5mg PO BID, admin 11/25-28    Labs: 11/25 proBNP 1233; Covid-19 PCR: detected    TTE echo 11/28- Summary:  1. LV Ejection Fraction by Bo's Method with a biplane EF of 68 %.  2. Normal global left ventricular systolic function.  3. Spectral Doppler shows impaired relaxation pattern of left   ventricular myocardial filling (Grade I diastolic dysfunction).  4. Mildly enlarged left atrium.  5. Trivial aortic regurgitation.    Based on the above clinical indicators, and your professional judgment, can the etiology of the chest pain be further clarified?    -chest pain is sequelae of recent covid infection  -chest pain associated with hypertension   -chest pain associated with hypertensive acute diastolic heart failure  -other, please specify:  -clinically unable to determine        Thank you,  Lacey Mckee, RN  [phone]
CLINICAL INDICATORS:    HP 11/25- Reason for Admission: worsening Chest pressure for 3 days, HTN on home reading; Covid 2 weeks ago. A/P: likely 2/2 recent COVID vs new onset decompensated HF vs increased salt intake    IM 11/26-#Chest pain; in setting of covid; #Covid- CXR bl opacities, no hypoxia, satting well on RA, monitor off steroids    Hosp 11/27-A/P: COVID-19 infection: diagnosed on 11/13 outpatient, Currently no fever, cough or hypoxia. CXR showed minimal interstitial infiltrates. No need for treatment, can discontinue isolation.    1/4 In response to CDI query for etiology of CP: Electronic Signatures for Addendum Section: Arslan Varela) (Signed Addendum 04-Jan-2023 13:40) 	Chest pain: unspecified, non-cardiac , possibly pleuritic.    11/25 Covid-19 PCR: detected    Orders: 11/25 Inpatient order. Isolation: none; 11/26 Bed request type telemetry. Isolation: none    Based on the above clinical indicators, and your professional judgment, can the diagnosis of COVID-19 infection be further clarified?    -COVID-19 was recent, not current infection during admission  -COVID-19 was a current infection during admission  -Other, please specify:  -Clinically unable to determine    Thank you,  Lacey Mckee RN, BSN, BBA, CCDS, CCS  [phone]

## 2023-01-05 NOTE — CDI QUERY NOTE - NSCDINOTECODERNAME_GEN_A_CORE_FT
Lacey Mckee RN, BSN, BBA, CCDS, CCS
Lacey Mckee RN
Lacey Mckee RN
Lacey Mckee RN, BSN, BBA, CCDS, CCS

## 2023-01-11 NOTE — CHART NOTE - NSCHARTNOTEFT_GEN_A_CORE
COVID-19 was recent, not current infection during admission        patient had new onset decompensated diastolic heart failure